# Patient Record
Sex: FEMALE | Race: WHITE | NOT HISPANIC OR LATINO | Employment: OTHER | URBAN - METROPOLITAN AREA
[De-identification: names, ages, dates, MRNs, and addresses within clinical notes are randomized per-mention and may not be internally consistent; named-entity substitution may affect disease eponyms.]

---

## 2017-04-28 ENCOUNTER — HOSPITAL ENCOUNTER (OUTPATIENT)
Facility: OTHER | Age: 57
Discharge: HOME | End: 2017-04-28
Attending: ORTHOPAEDIC SURGERY | Admitting: ORTHOPAEDIC SURGERY
Payer: COMMERCIAL

## 2017-04-28 VITALS
RESPIRATION RATE: 16 BRPM | SYSTOLIC BLOOD PRESSURE: 127 MMHG | HEIGHT: 67 IN | BODY MASS INDEX: 29.03 KG/M2 | HEART RATE: 91 BPM | TEMPERATURE: 98 F | WEIGHT: 185 LBS | DIASTOLIC BLOOD PRESSURE: 65 MMHG | OXYGEN SATURATION: 99 %

## 2017-04-28 DIAGNOSIS — M19.071: ICD-10-CM

## 2017-04-28 PROCEDURE — 63600175 PHARM REV CODE 636 W HCPCS

## 2017-04-28 PROCEDURE — 25500020 PHARM REV CODE 255

## 2017-04-28 PROCEDURE — 25000003 PHARM REV CODE 250

## 2017-04-28 RX ORDER — LOSARTAN POTASSIUM 100 MG/1
100 TABLET ORAL DAILY
COMMUNITY
End: 2020-09-30 | Stop reason: CLARIF

## 2017-04-28 RX ORDER — BUPIVACAINE HYDROCHLORIDE 2.5 MG/ML
INJECTION, SOLUTION EPIDURAL; INFILTRATION; INTRACAUDAL
Status: DISCONTINUED | OUTPATIENT
Start: 2017-04-28 | End: 2017-04-28 | Stop reason: HOSPADM

## 2017-04-28 RX ORDER — TRAZODONE HYDROCHLORIDE 50 MG/1
50 TABLET ORAL NIGHTLY
COMMUNITY

## 2017-04-28 RX ORDER — LAMOTRIGINE 150 MG/1
150 TABLET ORAL 2 TIMES DAILY
COMMUNITY
End: 2020-09-30 | Stop reason: CLARIF

## 2017-04-28 RX ORDER — TRIAMCINOLONE ACETONIDE 40 MG/ML
INJECTION, SUSPENSION INTRA-ARTICULAR; INTRAMUSCULAR
Status: DISCONTINUED | OUTPATIENT
Start: 2017-04-28 | End: 2017-04-28 | Stop reason: HOSPADM

## 2017-04-28 RX ORDER — AMLODIPINE BESYLATE 5 MG/1
5 TABLET ORAL DAILY
COMMUNITY
End: 2020-09-30 | Stop reason: CLARIF

## 2017-04-28 RX ORDER — HYDROCODONE BITARTRATE 40 MG/1
40 CAPSULE, EXTENDED RELEASE ORAL
COMMUNITY
End: 2020-09-30 | Stop reason: CLARIF

## 2017-04-28 RX ORDER — LIDOCAINE HYDROCHLORIDE 10 MG/ML
INJECTION INFILTRATION; PERINEURAL
Status: DISCONTINUED | OUTPATIENT
Start: 2017-04-28 | End: 2017-04-28 | Stop reason: HOSPADM

## 2017-04-28 RX ORDER — PROCHLORPERAZINE MALEATE 10 MG
10 TABLET ORAL 3 TIMES DAILY
COMMUNITY

## 2017-04-28 NOTE — H&P
Consult/H&P Note  Interventional Radiology    Consult Requested By: Shravan    Reason for Consult: subtalar arthritis    SUBJECTIVE:     Chief Complaint: R foot pain    History of Present Illness: 57 yo F with longstanding R foot pain, worse with inversion/eversion.    Past Medical History:   Diagnosis Date    Migraines 2015     Past Surgical History:   Procedure Laterality Date    TOTAL KNEE ARTHROPLASTY Bilateral 2007    right Jan. 2007; left Jan 2010     History reviewed. No pertinent family history.  Social History   Substance Use Topics    Smoking status: Never Smoker    Smokeless tobacco: Never Used    Alcohol use No       Review of Systems:  Constitutional/General:No fever, chills, change in appetite or weight loss.  Hematological/Immuno: no known coagulopathies  Respiratory: no shortness of breath  Cardiovascular: no chest pain  Gastrointestinal: no abdominal pain  Genito-Urinary: no dysuria  Musculoskeletal: positive for - pain in foot - right  Skin: Negative for rash, itching, pigmentation changes, nail or hair changes.  Neurological: no TIA or stroke symptoms  Psychiatric: normal mood/affect, good insight/judgement      OBJECTIVE:     Vital Signs Range (Last 24H):  Temp:  [98.2 °F (36.8 °C)]   Pulse:  [84]   Resp:  [16]   BP: (126)/(67)   SpO2:  [97 %]     Physical Exam:  General- Patient alert and oriented x3 in NAD  ENT- PERRLA,  Neck- No masses  CV- Regular rate and rhythm  Resp-  No increased WOB  GI- Non tender/non-distended  Extrem- No cyanosis, clubbing, edema. R foot pain with inversion/eversion; plantar and dorsiflexion  Is better tolerated  Derm- No rashes, masses, or lesions noted  Neuro-  No focal deficits noted.     Physical Exam  Body mass index is 28.98 kg/(m^2).    Scheduled Meds:   Continuous Infusions:   PRN Meds:    Allergies:   Review of patient's allergies indicates:   Allergen Reactions    Chlorhexidine gluconate Hives    Betadine surgi-prep Rash       Labs:  No results for  input(s): INR in the last 168 hours.    Invalid input(s):  PT,  PTT  No results for input(s): WBC, HGB, HCT, MCV, PLT in the last 168 hours. No results for input(s): GLU, NA, K, CL, CO2, BUN, CREATININE, CALCIUM, MG, ALT, AST, ALBUMIN, BILITOT, BILIDIR in the last 168 hours.    Vitals (Most Recent):  Temp: 98.2 °F (36.8 °C) (04/28/17 1006)  Pulse: 84 (04/28/17 1006)  Resp: 16 (04/28/17 1006)  BP: 126/67 (04/28/17 1006)  SpO2: 97 % (04/28/17 1006)    ASA: 2  Mallampati: 2    Consent obtained    ASSESSMENT/PLAN:     R subtalar joint injection    Active Hospital Problems    Diagnosis  POA    Degenerative arthritis of right ankle [M19.071]  Yes      Resolved Hospital Problems    Diagnosis Date Resolved POA   No resolved problems to display.           Magen Maguire MD

## 2017-04-28 NOTE — DISCHARGE INSTRUCTIONS

## 2017-04-28 NOTE — IP AVS SNAPSHOT
[facility] [place], operated by [facility]  [address] [place] [id number]  Phone: [phone]           Patient Discharge Instructions   Our goal is to set you up for success. This packet includes information on your condition, medications, and your home care.  It will help you care for yourself to prevent having to return to the hospital.     Please ask your nurse if you have any questions.      There are many details to remember when preparing to leave the hospital. Here is what you will need to do:    1. Take your medicine. If you are prescribed medications, review your Medication List on the following pages. You may have new medications to  at the pharmacy and others that you'll need to stop taking. Review the instructions for how and when to take your medications. Talk with your doctor or nurses if you are unsure of what to do.     2. Go to your follow-up appointments. Specific follow-up information is listed in the following pages. Your may be contacted by a nurse or clinical provider about future appointments. Be sure we have all of the phone numbers to reach you. Please contact your provider's office if you are unable to make an appointment.     3. Watch for warning signs. Your doctor or nurse will give you detailed warning signs to watch for and when to call for assistance. These instructions may also include educational information about your condition. If you experience any of warning signs to your health, call your doctor.               ** Verify the list of medication(s) below is accurate and up to date. Carry this with you in case of emergency. If your medications have changed, please notify your healthcare provider.             Medication List      CONTINUE taking these medications        Additional Info                      amlodipine 5 MG tablet   Commonly known as:  NORVASC   Refills:  0   Dose:  5 mg    Instructions:  Take 5 mg by mouth once daily.     Begin Date    AM    Noon    PM     Bedtime       hydrocodone bitartrate 40 mg Cr12   Refills:  0   Dose:  40 mg    Instructions:  Take 40 mg by mouth every 12 (twelve) hours.     Begin Date    AM    Noon    PM    Bedtime       lamotrigine 150 MG Tab   Commonly known as:  LAMICTAL   Refills:  0   Dose:  150 mg    Instructions:  Take 150 mg by mouth 2 (two) times daily.     Begin Date    AM    Noon    PM    Bedtime       losartan 100 MG tablet   Commonly known as:  COZAAR   Refills:  0   Dose:  100 mg    Instructions:  Take 100 mg by mouth once daily.     Begin Date    AM    Noon    PM    Bedtime       prochlorperazine 10 MG tablet   Commonly known as:  COMPAZINE   Refills:  0   Dose:  10 mg    Instructions:  Take 10 mg by mouth 3 (three) times daily.     Begin Date    AM    Noon    PM    Bedtime       trazodone 50 MG tablet   Commonly known as:  DESYREL   Refills:  0   Dose:  50 mg    Instructions:  Take 50 mg by mouth every evening.     Begin Date    AM    Noon    PM    Bedtime                  Please bring to all follow up appointments:    1. A copy of your discharge instructions.  2. All medicines you are currently taking in their original bottles.  3. Identification and insurance card.    Please arrive 15 minutes ahead of scheduled appointment time.    Please call 24 hours in advance if you must reschedule your appointment and/or time.        Follow-up Information     Call Field Shravan MD.    Specialty:  Orthopedic Surgery    Why:  As needed    Contact information:    [address]  [facility]  [facility] [id number] [phone]          Discharge Instructions     Future Orders    Activity as tolerated     Call MD for:  redness, tenderness, or signs of infection (pain, swelling, redness, odor or green/yellow discharge around incision site)     Diet general     Questions:    Total calories:      Fat restriction, if any:      Protein restriction, if any:      Na restriction, if any:      Fluid restriction:      Additional  "restrictions:      Remove dressing in 24 hours         Discharge Instructions       Home Care Instructions Pain Management:    1. DIET:   You may resume your normal diet today.   2. BATHING:   You may shower with luke warm water.  3. DRESSING:   You may remove your bandage today.   4. ACTIVITY LEVEL:   You may resume your normal activities 24 hrs after your procedure.  5. MEDICATIONS:   You may resume your normal medications today.   6. SPECIAL INSTRUCTIONS:   No heat to the injection site for 24 hrs including, bath or shower, heating pad, moist heat, or hot tubs.    Use ice pack to injection site for any pain or discomfort.  Apply ice packs for 20 minute intervals as needed.   If you have received any sedatives by mouth today you may not drive for 12 hours.    If you have received any sedation through your IV, you may not drive for 24 hrs.     PLEASE CALL YOUR DOCTOR IF:  1. Redness or swelling around the injection site.  2. Fever of 101 degrees  3. Drainage (pus) from the injection site.  4. For any continuous bleeding (some dried blood over the incision is normal.)    FOR EMERGENCIES:   If any unusual problems or difficulties occur during clinic hours, call [phone] or [phone].         Admission Information     Date & Time Provider Department CSN    4/28/2017  9:18 AM Andre De La Garza) MD Shravan [facility] [id number]      Care Providers     Provider Role Specialty Primary office phone    Andre De La Garza) MD Shravan Attending Provider Orthopedic Surgery [phone]    [facility] Diagnostic Provider Surgeon  -- Number not on file      Your Vitals Were     BP Pulse Temp Resp Height Weight    127/65 (BP Location: Right arm, Patient Position: Lying, BP Method: Automatic) 91 98.2 °F (36.8 °C) (Oral) 16 5' 7" (1.702 m) 83.9 kg (185 lb)    SpO2 BMI             99% 28.98 kg/m2         Recent Lab Values     No lab values to display.      Allergies as of 4/28/2017        Reactions    Chlorhexidine Gluconate " Hives    Betadine Surgi-prep Rash      [facility]     [facility] Nurse Care Line - 24/7 Assistance  Unless otherwise directed by your provider, please contact [facility], our nurse care line that is available for 24/7 assistance.     Registered nurses in the [facility] provide clinical advisement, health education, appointment booking, and other advisory services.  Call for this free service at [phone].        Advance Directives     An advance directive is a document which, in the event you are no longer able to make decisions for yourself, tells your healthcare team what kind of treatment you do or do not want to receive, or who you would like to make those decisions for you.  If you do not currently have an advance directive, [facility] encourages you to create one.  For more information call:  [phone] ([phone]), [phone] ([phone]),  or log on to [email or web address].        Language Assistance Services     ATTENTION: Language assistance services are available, free of charge. Please call [phone].      ATENCIÓN: Si habla español, tiene a jones disposición servicios gratuitos de asistencia lingüística. Llame al [phone].     CHÚ Ý: N?u b?n nói Ti?ng Vi?t, có các d?ch v? h? tr? ngôn ng? mi?n phí dành cho b?n. G?i s? [phone].        [facility] Sign-Up     Activating your [facility] account is as easy as 1-2-3!     1) Visit [email or web address], select Sign Up Now, enter this activation code and your date of birth, then select Next.  [email or web address]  Expires: 6/12/2017 11:15 AM      2) Create a username and password to use when you visit [facility] in the future and select a security question in case you lose your password and select Next.    3) Enter your e-mail address and click Sign Up!    Additional Information  If you have questions, please e-mail [email or web address] or call [phone] to talk to our [facility] staff. Remember, [facility] is NOT to  be used for urgent needs. For medical emergencies, dial 911.          [facility] complies with applicable Federal civil rights laws and does not discriminate on the basis of race, color, national origin, age, disability, or sex.

## 2017-04-28 NOTE — DISCHARGE SUMMARY
Radiology Post-Procedure Note    Pre Op Diagnosis: R subtalar arthritis  Post Op Diagnosis: Same    Procedure: R subtalar joint injection    Procedure performed by: Andrez    Written Informed Consent Obtained: Yes  Specimen Removed: NO  Estimated Blood Loss: Minimal    Findings:   Successful R subtalar joint injection, 40 mg Kenalog, 2 cc 0.25% bupivicaine    Patient tolerated procedure well.    @SIG@

## 2017-04-28 NOTE — PLAN OF CARE

## 2017-05-02 NOTE — PROCEDURES
Pre Op Diagnosis: R subtalar arthritis  Post Op Diagnosis: Same     Procedure: R subtalar joint injection     Procedure performed by: Andrez     Written Informed Consent Obtained: Yes  Specimen Removed: NO  Estimated Blood Loss: Minimal     Findings:   Successful R subtalar joint injection, 40 mg Kenalog, 2 cc 0.25% bupivicaine

## 2017-11-10 ENCOUNTER — SURGERY (OUTPATIENT)
Age: 57
End: 2017-11-10

## 2017-11-10 ENCOUNTER — APPOINTMENT (OUTPATIENT)
Dept: INTERVENTIONAL RADIOLOGY/VASCULAR | Facility: OTHER | Age: 57
End: 2017-11-10
Attending: ORTHOPAEDIC SURGERY
Payer: COMMERCIAL

## 2017-11-10 ENCOUNTER — HOSPITAL ENCOUNTER (OUTPATIENT)
Facility: OTHER | Age: 57
Discharge: HOME | End: 2017-11-10
Attending: RADIOLOGY | Admitting: RADIOLOGY
Payer: COMMERCIAL

## 2017-11-10 VITALS
BODY MASS INDEX: 29.82 KG/M2 | RESPIRATION RATE: 16 BRPM | HEIGHT: 67 IN | OXYGEN SATURATION: 99 % | SYSTOLIC BLOOD PRESSURE: 122 MMHG | TEMPERATURE: 99 F | WEIGHT: 190 LBS | DIASTOLIC BLOOD PRESSURE: 64 MMHG | HEART RATE: 87 BPM

## 2017-11-10 DIAGNOSIS — M19.071 PRIMARY OSTEOARTHRITIS OF RIGHT ANKLE: ICD-10-CM

## 2017-11-10 DIAGNOSIS — M19.071 PRIMARY OSTEOARTHRITIS, RIGHT ANKLE AND FOOT: Primary | ICD-10-CM

## 2017-11-10 DIAGNOSIS — M79.673 FOOT PAIN: ICD-10-CM

## 2017-11-10 PROCEDURE — 25500020 PHARM REV CODE 255

## 2017-11-10 PROCEDURE — 63600175 PHARM REV CODE 636 W HCPCS

## 2017-11-10 PROCEDURE — 77002 NEEDLE LOCALIZATION BY XRAY: CPT | Mod: 26,,, | Performed by: RADIOLOGY

## 2017-11-10 PROCEDURE — 20605 DRAIN/INJ JOINT/BURSA W/O US: CPT | Mod: RT,,, | Performed by: RADIOLOGY

## 2017-11-10 PROCEDURE — 25000003 PHARM REV CODE 250

## 2017-11-10 PROCEDURE — 20600 DRAIN/INJ JOINT/BURSA W/O US: CPT

## 2017-11-10 RX ORDER — BUPIVACAINE HYDROCHLORIDE 2.5 MG/ML
INJECTION, SOLUTION EPIDURAL; INFILTRATION; INTRACAUDAL
Status: DISCONTINUED | OUTPATIENT
Start: 2017-11-10 | End: 2017-11-10 | Stop reason: HOSPADM

## 2017-11-10 RX ORDER — TRIAMCINOLONE ACETONIDE 40 MG/ML
INJECTION, SUSPENSION INTRA-ARTICULAR; INTRAMUSCULAR
Status: DISCONTINUED | OUTPATIENT
Start: 2017-11-10 | End: 2017-11-10 | Stop reason: HOSPADM

## 2017-11-10 RX ORDER — LIDOCAINE HYDROCHLORIDE 10 MG/ML
INJECTION, SOLUTION EPIDURAL; INFILTRATION; INTRACAUDAL; PERINEURAL
Status: DISCONTINUED | OUTPATIENT
Start: 2017-11-10 | End: 2017-11-10 | Stop reason: HOSPADM

## 2017-11-10 RX ADMIN — LIDOCAINE HYDROCHLORIDE 3 ML: 10 INJECTION, SOLUTION EPIDURAL; INFILTRATION; INTRACAUDAL; PERINEURAL at 10:11

## 2017-11-10 RX ADMIN — TRIAMCINOLONE ACETONIDE 40 MG: 40 INJECTION, SUSPENSION INTRA-ARTICULAR; INTRAMUSCULAR at 10:11

## 2017-11-10 RX ADMIN — BUPIVACAINE HYDROCHLORIDE 3 ML: 2.5 INJECTION, SOLUTION EPIDURAL; INFILTRATION; INTRACAUDAL at 10:11

## 2017-11-10 NOTE — PROCEDURES
Radiology Post-Procedure Note    Pre Op Diagnosis: R subtalar arthritis  Post Op Diagnosis: Same    Procedure: subtalar steroid injection    Procedure performed by: Magen Maguire MD    Written Informed Consent Obtained: Yes  Specimen Removed: NO  Estimated Blood Loss: Minimal    Findings:   Successful R subtalar injection.  40 mg Kenalog, 2 cc bupivicaine.    Patient tolerated procedure well.    @SIG@

## 2017-11-10 NOTE — H&P
Consult/H&P Note  Interventional Radiology    Consult Requested By: Shravan     Reason for Consult: R foot pain    SUBJECTIVE:     Chief Complaint: R foot pain    History of Present Illness: 58 yo F with R foot pain, had a R subtalar joint injection 4/28/17 with good relief, although pain is returning.    Past Medical History:   Diagnosis Date    Migraines 2015     Past Surgical History:   Procedure Laterality Date    TOTAL KNEE ARTHROPLASTY Bilateral 2007    right Jan. 2007; left Jan 2010     History reviewed. No pertinent family history.  Social History   Substance Use Topics    Smoking status: Never Smoker    Smokeless tobacco: Never Used    Alcohol use No       Review of Systems:  Constitutional/General:No fever, chills, change in appetite or weight loss.  Hematological/Immuno: no known coagulopathies  Respiratory: no shortness of breath  Cardiovascular: no chest pain  Gastrointestinal: no abdominal pain  Genito-Urinary: no dysuria  Musculoskeletal: positive for - pain in foot - right  Skin: Negative for rash, itching, pigmentation changes, nail or hair changes.  Neurological: no TIA or stroke symptoms  Psychiatric: normal mood/affect, good insight/judgement      OBJECTIVE:     Vital Signs Range (Last 24H):  Temp:  [98.7 °F (37.1 °C)]   Pulse:  [87]   Resp:  [18]   BP: (129)/(73)   SpO2:  [97 %]     Physical Exam:  General- Patient alert and oriented x3 in NAD  ENT- PERRLA,  Neck- No masses  CV- Regular rate and rhythm  Resp-  No increased WOB  GI- Non tender/non-distended  Extrem- No cyanosis, clubbing, edema.   Derm- No rashes, masses, or lesions noted  Neuro-  No focal deficits noted.     Physical Exam  Body mass index is 29.76 kg/m².    Scheduled Meds:   Continuous Infusions:   PRN Meds:    Allergies:   Review of patient's allergies indicates:   Allergen Reactions    Chlorhexidine gluconate Hives    Betadine surgi-prep Rash       Labs:  No results for input(s): INR in the last 168 hours.    Invalid  input(s):  PT,  PTT  No results for input(s): WBC, HGB, HCT, MCV, PLT in the last 168 hours. No results for input(s): GLU, NA, K, CL, CO2, BUN, CREATININE, CALCIUM, MG, ALT, AST, ALBUMIN, BILITOT, BILIDIR in the last 168 hours.    Vitals (Most Recent):  Temp: 98.7 °F (37.1 °C) (11/10/17 0936)  Pulse: 87 (11/10/17 0936)  Resp: 18 (11/10/17 0936)  BP: 129/73 (11/10/17 0936)  SpO2: 97 % (11/10/17 0936)    ASA: 2  Mallampati: 2    Consent obtained    ASSESSMENT/PLAN:     R foot subtalar joint injection.    Active Hospital Problems    Diagnosis  POA    Foot pain [M79.673]  Yes      Resolved Hospital Problems    Diagnosis Date Resolved POA   No resolved problems to display.           Magen Maguire MD

## 2017-11-10 NOTE — DISCHARGE INSTRUCTIONS

## 2017-11-10 NOTE — DISCHARGE SUMMARY
[facility]  Discharge Summary      Admit Date: 11/10/2017    Discharge Date and Time:  11/10/2017 10:37 AM    Attending Physician: Magen Maguire MD     Reason for Admission: subtalar injection    Procedures Performed: Procedure(s) (LRB):  INJECTION (Right)    Hospital Course (synopsis of major diagnoses, care, treatment, and services provided during the course of the hospital stay): uneventful     Final Diagnoses:    Principal Problem: <principal problem not specified>   Secondary Diagnoses:   Active Hospital Problems    Diagnosis  POA    Foot pain [M79.673]  Yes      Resolved Hospital Problems    Diagnosis Date Resolved POA   No resolved problems to display.       Discharged Condition: good    Disposition: Home or Self Care    Follow Up/Patient Instructions:     Medications:  Reconciled Home Medications:   Current Discharge Medication List      CONTINUE these medications which have NOT CHANGED    Details   amlodipine (NORVASC) 5 MG tablet Take 5 mg by mouth once daily.      hydrocodone bitartrate 40 mg CR12 Take 40 mg by mouth every 12 (twelve) hours.      lamotrigine (LAMICTAL) 150 MG Tab Take 150 mg by mouth 2 (two) times daily.      losartan (COZAAR) 100 MG tablet Take 100 mg by mouth once daily.      prochlorperazine (COMPAZINE) 10 MG tablet Take 10 mg by mouth 3 (three) times daily.      trazodone (DESYREL) 50 MG tablet Take 50 mg by mouth every evening.           No discharge procedures on file.  Follow-up Information     Field Shravan MD.    Specialty:  Orthopedic Surgery  Why:  As needed  Contact information:  [address]  [facility]  [facility] [address] [phone]

## 2018-10-29 ENCOUNTER — LAB VISIT (OUTPATIENT)
Dept: LAB | Facility: HOSPITAL | Age: 58
End: 2018-10-29
Attending: PSYCHIATRY & NEUROLOGY
Payer: COMMERCIAL

## 2018-10-29 DIAGNOSIS — R49.0 DYSPHONIA OF ORGANIC TREMOR: ICD-10-CM

## 2018-10-29 DIAGNOSIS — G43.919 INTRACTABLE MIGRAINE: ICD-10-CM

## 2018-10-29 DIAGNOSIS — R25.1 DYSPHONIA OF ORGANIC TREMOR: ICD-10-CM

## 2018-10-29 DIAGNOSIS — G43.109 CLASSICAL MIGRAINE: ICD-10-CM

## 2018-10-29 DIAGNOSIS — G43.711 CHRONIC MIGRAINE WITHOUT AURA, WITH INTRACTABLE MIGRAINE, SO STATED, WITH STATUS MIGRAINOSUS: Primary | ICD-10-CM

## 2018-10-29 DIAGNOSIS — R41.3 MEMORY LOSS: ICD-10-CM

## 2018-10-29 DIAGNOSIS — G62.9 PERIPHERAL NERVE DISORDER: ICD-10-CM

## 2018-10-29 DIAGNOSIS — G44.85 STABBING HEADACHE: ICD-10-CM

## 2018-10-29 PROCEDURE — 86255 FLUORESCENT ANTIBODY SCREEN: CPT | Mod: 59

## 2018-10-29 PROCEDURE — 36415 COLL VENOUS BLD VENIPUNCTURE: CPT

## 2018-11-23 LAB
ACHR BIND AB SER-SCNC: 0 NMOL/L
AMPA-R AB CBA, SERUM: NEGATIVE
AMPHIPHYSIN AB TITR SER: NEGATIVE TITER
CASPR2-IGG CBA: NEGATIVE
CV2 IGG TITR SER: NEGATIVE TITER
GABA-B-R AB CBA, SERUM: NEGATIVE
GAD65 AB SER-SCNC: 0 NMOL/L
GLIAL NUC TYPE 1 AB TITR SER: NEGATIVE TITER
HU1 AB TITR SER: NEGATIVE TITER
HU2 AB TITR SER IF: NEGATIVE TITER
HU3 AB TITR SER: NEGATIVE TITER
IMMUNOLOGIST REVIEW: NORMAL
LGI1-IGG CBA: NEGATIVE
NACHR AB SER-SCNC: 0 NMOL/L
NMDA-R AB CBA, SERUM: NEGATIVE
PAVAL REFLEX TEST ADDED: NORMAL
PCA-1 AB TITR SER: NEGATIVE TITER
PCA-2 AB TITR SER: NEGATIVE TITER
PCA-TR AB TITR SER: NEGATIVE TITER
VGCC-N BIND AB SER-SCNC: 0 NMOL/L
VGCC-P/Q BIND AB SER-SCNC: 0 NMOL/L
VGKC AB SER-SCNC: 0 NMOL/L

## 2020-09-30 ENCOUNTER — HOSPITAL ENCOUNTER (OUTPATIENT)
Dept: PREADMISSION TESTING | Facility: OTHER | Age: 60
Discharge: HOME | End: 2020-09-30
Attending: ORTHOPAEDIC SURGERY
Payer: MEDICARE

## 2020-09-30 ENCOUNTER — ANESTHESIA EVENT (OUTPATIENT)
Dept: SURGERY | Facility: OTHER | Age: 60
End: 2020-09-30
Payer: MEDICARE

## 2020-09-30 VITALS
OXYGEN SATURATION: 98 % | HEIGHT: 67 IN | WEIGHT: 202 LBS | DIASTOLIC BLOOD PRESSURE: 57 MMHG | HEART RATE: 69 BPM | BODY MASS INDEX: 31.71 KG/M2 | SYSTOLIC BLOOD PRESSURE: 117 MMHG | TEMPERATURE: 98 F

## 2020-09-30 RX ORDER — ESLICARBAZEPINE ACETATE 200 MG/1
400 TABLET ORAL
COMMUNITY
End: 2022-03-10 | Stop reason: CLARIF

## 2020-09-30 RX ORDER — SODIUM CHLORIDE, SODIUM LACTATE, POTASSIUM CHLORIDE, CALCIUM CHLORIDE 600; 310; 30; 20 MG/100ML; MG/100ML; MG/100ML; MG/100ML
INJECTION, SOLUTION INTRAVENOUS CONTINUOUS
Status: CANCELLED | OUTPATIENT
Start: 2020-09-30

## 2020-09-30 RX ORDER — ZONISAMIDE 100 MG/1
200 CAPSULE ORAL DAILY
COMMUNITY

## 2020-09-30 RX ORDER — PROPRANOLOL HYDROCHLORIDE 60 MG/1
60 TABLET ORAL 3 TIMES DAILY
COMMUNITY
End: 2022-03-10 | Stop reason: CLARIF

## 2020-09-30 RX ORDER — LIDOCAINE HYDROCHLORIDE 10 MG/ML
0.5 INJECTION, SOLUTION EPIDURAL; INFILTRATION; INTRACAUDAL; PERINEURAL ONCE
Status: CANCELLED | OUTPATIENT
Start: 2020-09-30 | End: 2020-09-30

## 2020-09-30 RX ORDER — OXYCODONE HCL 10 MG/1
10 TABLET, FILM COATED, EXTENDED RELEASE ORAL EVERY 12 HOURS PRN
COMMUNITY
End: 2020-10-11

## 2020-09-30 RX ORDER — ACETAMINOPHEN 500 MG
1000 TABLET ORAL
Status: CANCELLED | OUTPATIENT
Start: 2020-09-30 | End: 2020-09-30

## 2020-09-30 RX ORDER — PANTOPRAZOLE SODIUM 40 MG/1
40 TABLET, DELAYED RELEASE ORAL DAILY
COMMUNITY

## 2020-09-30 RX ORDER — SIMVASTATIN 20 MG/1
20 TABLET, FILM COATED ORAL NIGHTLY
COMMUNITY

## 2020-09-30 NOTE — DISCHARGE INSTRUCTIONS
Information to Prepare you for your Surgery    PRE-ADMIT TESTING -  [phone]    [address]  [facility]          Your surgery has been scheduled at [facility]. We are pleased to have the opportunity to serve you. For Further Information please call [phone].    On the day of surgery please report to the Information Desk on the 1st floor.    · CONTACT YOUR PHYSICIAN'S OFFICE THE DAY PRIOR TO YOUR SURGERY TO OBTAIN YOUR ARRIVAL TIME.     · The evening before surgery do not eat anything after 9 p.m. ( this includes hard candy, chewing gum and mints).  You may only have GATORADE, POWERADE AND WATER  from 9 p.m. until you leave your home.   DO NOT DRINK ANY LIQUIDS ON THE WAY TO THE HOSPITAL.      SPECIAL MEDICATION INSTRUCTIONS: TAKE medications checked off by the Anesthesiologist on your Medication List.    Angiogram Patients: Take medications as instructed by your physician, including aspirin.     Surgery Patients:    If you take ASPIRIN - Your PHYSICIAN/SURGEON will need to inform you IF/OR when you need to stop taking aspirin prior to your surgery.     Do Not take any medications containing IBUPROFEN.  Do Not Wear any make-up or dark nail polish   (especially eye make-up) to surgery. If you come to surgery with makeup on you will be required to remove the makeup or nail polish.    Do not shave your surgical area at least 5 days prior to your surgery. The surgical prep will be performed at the hospital according to Infection Control regulations.    Leave all valuables at home.   Do Not wear any jewelry or watches, including any metal in body piercings. Jewelry must be removed prior to coming to the hospital.  There is a possibility that rings that are unable to be removed may be cut off if they are on the surgical extremity.    Contact Lens must be removed before surgery. Either do not wear the contact lens or bring a case and solution for  storage.  Please bring a container for eyeglasses or dentures as required.  Bring any paperwork your physician has provided, such as consent forms,  history and physicals, doctor's orders, etc.   Bring comfortable clothes that are loose fitting to wear upon discharge. Take into consideration the type of surgery being performed.  Maintain your diet as advised per your physician the day prior to surgery.      Adequate rest the night before surgery is advised.   Park in the Parking lot behind the hospital or in the [place] [facility] across the street from the parking lot. Parking is complimentary.  If you will be discharged the same day as your procedure, please arrange for a responsible adult to drive you home or to accompany you if traveling by taxi.   YOU WILL NOT BE PERMITTED TO DRIVE OR TO LEAVE THE HOSPITAL ALONE AFTER SURGERY.   If you are being discharged the same day, it is strongly recommended that you arrange for someone to remain with you for the first 24 hrs following your surgery.    The Surgeon will speak to your family/visitor after your surgery regarding the outcome of your surgery and post op care.  The Surgeon may speak to you after your surgery, but there is a possibility you may not remember the details.  Please check with your family members regarding the conversation with the Surgeon.    We strongly recommend whoever is bringing you home be present for discharge instructions.  This will ensure a thorough understanding for your post op home care.    ALL CHILDREN MUST ALWAYS BE ACCOMPANIED BY AN ADULT.    Visitors-Refer to current Visitor policy handouts.    Thank you for your cooperation.  The Staff of [facility].                Bathing Instructions with Hibiclens     Shower the evening before and morning of your procedure with Hibiclens:   Wash your face with water and your regular face wash/soap   Apply Hibiclens directly on your skin or on a wet washcloth and wash  gently. When showering: Move away from the shower stream when applying Hibiclens to avoid rinsing off too soon.   Rinse thoroughly with warm water   Do not dilute Hibiclens         Dry off as usual, do not use any deodorant, powder, body lotions, perfume, after shave or cologne.

## 2020-09-30 NOTE — ANESTHESIA PREPROCEDURE EVALUATION
09/30/2020  Kecia Downing is a 60 y.o., female.    Anesthesia Evaluation    I have reviewed the Patient Summary Reports.    I have reviewed the Nursing Notes. I have reviewed the NPO Status.   I have reviewed the Medications.     Review of Systems  Anesthesia Hx:  Denies Family Hx of Anesthesia complications.  Personal Hx of Anesthesia complications, Post-Operative Nausea/Vomiting, in the past, but not with recent anesthetics / prophylaxis   Social:  Non-Smoker    Hematology/Oncology:  Hematology Normal   Oncology Normal     EENT/Dental:EENT/Dental Normal   Cardiovascular:   Denies Hypertension.     Pulmonary:  Pulmonary Normal    Renal/:  Renal/ Normal     Hepatic/GI:   GERD, well controlled    Musculoskeletal:   Arthritis   Spine Disorders: lumbar and cervical Chronic Pain    Neurological:   Headaches (rare migraine on prophylaxis, Ajovy injections, inderal)    Endocrine:  Endocrine Normal    Dermatological:  Skin Normal    Psych:  Psychiatric Normal           Physical Exam  General:  Obesity      Dental:  Dental Findings: In tact         Mental Status:  Mental Status Findings:  Cooperative, Alert and Oriented         Anesthesia Plan  Type of Anesthesia, risks & benefits discussed:  Anesthesia Type:  general, regional  Patient's Preference:   Intra-op Monitoring Plan: standard ASA monitors  Intra-op Monitoring Plan Comments:   Post Op Pain Control Plan: per primary service following discharge from PACU  Post Op Pain Control Plan Comments:   Induction:   IV  Beta Blocker:         Informed Consent: Patient understands risks and agrees with Anesthesia plan.  Questions answered. Anesthesia consent signed with patient.  ASA Score: 2     Day of Surgery Review of History & Physical:    H&P update referred to the surgeon.     Anesthesia Plan Notes: Discussed regional vs GA with local by surgeon.  Pt  had a bad experience in a bunionectomy with MAC, a lot of back pain and anxiety (although did not feel the surgery) Strongly prefers GA.         Ready For Surgery From Anesthesia Perspective.

## 2020-10-06 ENCOUNTER — CLINICAL SUPPORT (OUTPATIENT)
Dept: URGENT CARE | Facility: CLINIC | Age: 60
End: 2020-10-06
Payer: MEDICARE

## 2020-10-06 VITALS — TEMPERATURE: 98 F

## 2020-10-06 DIAGNOSIS — Z01.818 PREOP TESTING: ICD-10-CM

## 2020-10-06 LAB — SARS-COV-2 RNA RESP QL NAA+PROBE: NOT DETECTED

## 2020-10-06 PROCEDURE — 99000 PR SPECIMEN HANDLING,DR OFF->LAB: ICD-10-PCS | Mod: S$GLB,,, | Performed by: STUDENT IN AN ORGANIZED HEALTH CARE EDUCATION/TRAINING PROGRAM

## 2020-10-06 PROCEDURE — 99000 SPECIMEN HANDLING OFFICE-LAB: CPT | Mod: S$GLB,,, | Performed by: STUDENT IN AN ORGANIZED HEALTH CARE EDUCATION/TRAINING PROGRAM

## 2020-10-06 PROCEDURE — U0003 INFECTIOUS AGENT DETECTION BY NUCLEIC ACID (DNA OR RNA); SEVERE ACUTE RESPIRATORY SYNDROME CORONAVIRUS 2 (SARS-COV-2) (CORONAVIRUS DISEASE [COVID-19]), AMPLIFIED PROBE TECHNIQUE, MAKING USE OF HIGH THROUGHPUT TECHNOLOGIES AS DESCRIBED BY CMS-2020-01-R: HCPCS

## 2020-10-09 ENCOUNTER — HOSPITAL ENCOUNTER (OUTPATIENT)
Facility: OTHER | Age: 60
Discharge: HOME | End: 2020-10-09
Attending: ORTHOPAEDIC SURGERY | Admitting: ORTHOPAEDIC SURGERY
Payer: MEDICARE

## 2020-10-09 ENCOUNTER — ANESTHESIA (OUTPATIENT)
Dept: SURGERY | Facility: OTHER | Age: 60
End: 2020-10-09
Payer: MEDICARE

## 2020-10-09 VITALS
HEIGHT: 67 IN | SYSTOLIC BLOOD PRESSURE: 109 MMHG | WEIGHT: 202 LBS | RESPIRATION RATE: 16 BRPM | BODY MASS INDEX: 31.71 KG/M2 | TEMPERATURE: 98 F | OXYGEN SATURATION: 93 % | HEART RATE: 97 BPM | DIASTOLIC BLOOD PRESSURE: 57 MMHG

## 2020-10-09 DIAGNOSIS — M19.071 PRIMARY OSTEOARTHRITIS OF RIGHT ANKLE: ICD-10-CM

## 2020-10-09 DIAGNOSIS — G56.01 CARPAL TUNNEL SYNDROME ON RIGHT: Primary | ICD-10-CM

## 2020-10-09 DIAGNOSIS — Z01.818 PREOP TESTING: ICD-10-CM

## 2020-10-09 PROCEDURE — 25000003 PHARM REV CODE 250: Performed by: SPECIALIST

## 2020-10-09 PROCEDURE — 63600175 PHARM REV CODE 636 W HCPCS: Performed by: ANESTHESIOLOGY

## 2020-10-09 PROCEDURE — 25000003 PHARM REV CODE 250: Performed by: ANESTHESIOLOGY

## 2020-10-09 PROCEDURE — 36000709 HC OR TIME LEV III EA ADD 15 MIN: Performed by: ORTHOPAEDIC SURGERY

## 2020-10-09 PROCEDURE — 63600175 PHARM REV CODE 636 W HCPCS: Performed by: NURSE ANESTHETIST, CERTIFIED REGISTERED

## 2020-10-09 PROCEDURE — 25000003 PHARM REV CODE 250: Performed by: NURSE ANESTHETIST, CERTIFIED REGISTERED

## 2020-10-09 PROCEDURE — 71000016 HC POSTOP RECOV ADDL HR: Performed by: ORTHOPAEDIC SURGERY

## 2020-10-09 PROCEDURE — 71000039 HC RECOVERY, EACH ADD'L HOUR: Performed by: ORTHOPAEDIC SURGERY

## 2020-10-09 PROCEDURE — 37000008 HC ANESTHESIA 1ST 15 MINUTES: Performed by: ORTHOPAEDIC SURGERY

## 2020-10-09 PROCEDURE — 71000015 HC POSTOP RECOV 1ST HR: Performed by: ORTHOPAEDIC SURGERY

## 2020-10-09 PROCEDURE — 27201423 OPTIME MED/SURG SUP & DEVICES STERILE SUPPLY: Performed by: ORTHOPAEDIC SURGERY

## 2020-10-09 PROCEDURE — 71000033 HC RECOVERY, INTIAL HOUR: Performed by: ORTHOPAEDIC SURGERY

## 2020-10-09 PROCEDURE — 36000708 HC OR TIME LEV III 1ST 15 MIN: Performed by: ORTHOPAEDIC SURGERY

## 2020-10-09 PROCEDURE — 37000009 HC ANESTHESIA EA ADD 15 MINS: Performed by: ORTHOPAEDIC SURGERY

## 2020-10-09 PROCEDURE — C1713 ANCHOR/SCREW BN/BN,TIS/BN: HCPCS | Performed by: ORTHOPAEDIC SURGERY

## 2020-10-09 PROCEDURE — S0028 INJECTION, FAMOTIDINE, 20 MG: HCPCS | Performed by: NURSE ANESTHETIST, CERTIFIED REGISTERED

## 2020-10-09 PROCEDURE — 25000003 PHARM REV CODE 250: Performed by: ORTHOPAEDIC SURGERY

## 2020-10-09 DEVICE — SYS CMC LIG RECON IMPLANT: Type: IMPLANTABLE DEVICE | Site: HAND | Status: FUNCTIONAL

## 2020-10-09 RX ORDER — FENTANYL CITRATE 50 UG/ML
INJECTION, SOLUTION INTRAMUSCULAR; INTRAVENOUS
Status: DISCONTINUED | OUTPATIENT
Start: 2020-10-09 | End: 2020-10-09

## 2020-10-09 RX ORDER — ONDANSETRON 2 MG/ML
4 INJECTION INTRAMUSCULAR; INTRAVENOUS DAILY PRN
Status: DISCONTINUED | OUTPATIENT
Start: 2020-10-09 | End: 2020-10-09 | Stop reason: HOSPADM

## 2020-10-09 RX ORDER — OXYCODONE HYDROCHLORIDE 5 MG/1
5 TABLET ORAL
Status: DISCONTINUED | OUTPATIENT
Start: 2020-10-09 | End: 2020-10-09 | Stop reason: HOSPADM

## 2020-10-09 RX ORDER — DEXAMETHASONE SODIUM PHOSPHATE 4 MG/ML
INJECTION, SOLUTION INTRA-ARTICULAR; INTRALESIONAL; INTRAMUSCULAR; INTRAVENOUS; SOFT TISSUE
Status: DISCONTINUED | OUTPATIENT
Start: 2020-10-09 | End: 2020-10-09

## 2020-10-09 RX ORDER — OXYCODONE AND ACETAMINOPHEN 5; 325 MG/1; MG/1
1 TABLET ORAL EVERY 4 HOURS PRN
Qty: 20 TABLET | Refills: 0 | Status: SHIPPED | OUTPATIENT
Start: 2020-10-09 | End: 2022-03-10 | Stop reason: CLARIF

## 2020-10-09 RX ORDER — OXYCODONE HYDROCHLORIDE 5 MG/1
5 TABLET ORAL ONCE
Status: COMPLETED | OUTPATIENT
Start: 2020-10-09 | End: 2020-10-09

## 2020-10-09 RX ORDER — CEFAZOLIN SODIUM 1 G/3ML
INJECTION, POWDER, FOR SOLUTION INTRAMUSCULAR; INTRAVENOUS
Status: DISCONTINUED | OUTPATIENT
Start: 2020-10-09 | End: 2020-10-09

## 2020-10-09 RX ORDER — ACETAMINOPHEN 500 MG
1000 TABLET ORAL
Status: DISCONTINUED | OUTPATIENT
Start: 2020-10-09 | End: 2020-10-09 | Stop reason: HOSPADM

## 2020-10-09 RX ORDER — MEPERIDINE HYDROCHLORIDE 25 MG/ML
12.5 INJECTION INTRAMUSCULAR; INTRAVENOUS; SUBCUTANEOUS ONCE AS NEEDED
Status: DISCONTINUED | OUTPATIENT
Start: 2020-10-09 | End: 2020-10-09 | Stop reason: HOSPADM

## 2020-10-09 RX ORDER — HYDROCODONE BITARTRATE AND ACETAMINOPHEN 5; 325 MG/1; MG/1
1 TABLET ORAL EVERY 4 HOURS PRN
Status: DISCONTINUED | OUTPATIENT
Start: 2020-10-09 | End: 2020-10-09 | Stop reason: HOSPADM

## 2020-10-09 RX ORDER — ONDANSETRON HYDROCHLORIDE 2 MG/ML
INJECTION, SOLUTION INTRAMUSCULAR; INTRAVENOUS
Status: DISCONTINUED | OUTPATIENT
Start: 2020-10-09 | End: 2020-10-09

## 2020-10-09 RX ORDER — FAMOTIDINE 10 MG/ML
INJECTION INTRAVENOUS
Status: DISCONTINUED | OUTPATIENT
Start: 2020-10-09 | End: 2020-10-09

## 2020-10-09 RX ORDER — PROPOFOL 10 MG/ML
VIAL (ML) INTRAVENOUS
Status: DISCONTINUED | OUTPATIENT
Start: 2020-10-09 | End: 2020-10-09

## 2020-10-09 RX ORDER — BUPIVACAINE HYDROCHLORIDE AND EPINEPHRINE 2.5; 5 MG/ML; UG/ML
INJECTION, SOLUTION EPIDURAL; INFILTRATION; INTRACAUDAL; PERINEURAL
Status: DISCONTINUED | OUTPATIENT
Start: 2020-10-09 | End: 2020-10-09 | Stop reason: HOSPADM

## 2020-10-09 RX ORDER — SODIUM CHLORIDE 0.9 % (FLUSH) 0.9 %
3 SYRINGE (ML) INJECTION
Status: DISCONTINUED | OUTPATIENT
Start: 2020-10-09 | End: 2020-10-09 | Stop reason: HOSPADM

## 2020-10-09 RX ORDER — ACETAMINOPHEN 500 MG
1000 TABLET ORAL EVERY 6 HOURS PRN
COMMUNITY
End: 2022-03-10 | Stop reason: CLARIF

## 2020-10-09 RX ORDER — LIDOCAINE HCL/PF 100 MG/5ML
SYRINGE (ML) INTRAVENOUS
Status: DISCONTINUED | OUTPATIENT
Start: 2020-10-09 | End: 2020-10-09

## 2020-10-09 RX ORDER — MIDAZOLAM HYDROCHLORIDE 1 MG/ML
INJECTION INTRAMUSCULAR; INTRAVENOUS
Status: DISCONTINUED | OUTPATIENT
Start: 2020-10-09 | End: 2020-10-09

## 2020-10-09 RX ORDER — SODIUM CHLORIDE, SODIUM LACTATE, POTASSIUM CHLORIDE, CALCIUM CHLORIDE 600; 310; 30; 20 MG/100ML; MG/100ML; MG/100ML; MG/100ML
INJECTION, SOLUTION INTRAVENOUS CONTINUOUS
Status: DISCONTINUED | OUTPATIENT
Start: 2020-10-09 | End: 2020-10-09 | Stop reason: HOSPADM

## 2020-10-09 RX ORDER — LIDOCAINE HYDROCHLORIDE 10 MG/ML
0.5 INJECTION, SOLUTION EPIDURAL; INFILTRATION; INTRACAUDAL; PERINEURAL ONCE
Status: DISCONTINUED | OUTPATIENT
Start: 2020-10-09 | End: 2020-10-09 | Stop reason: HOSPADM

## 2020-10-09 RX ORDER — HYDROMORPHONE HYDROCHLORIDE 2 MG/ML
0.4 INJECTION, SOLUTION INTRAMUSCULAR; INTRAVENOUS; SUBCUTANEOUS EVERY 5 MIN PRN
Status: COMPLETED | OUTPATIENT
Start: 2020-10-09 | End: 2020-10-09

## 2020-10-09 RX ADMIN — HYDROMORPHONE HYDROCHLORIDE 0.4 MG: 2 INJECTION INTRAMUSCULAR; INTRAVENOUS; SUBCUTANEOUS at 09:10

## 2020-10-09 RX ADMIN — SODIUM CHLORIDE, SODIUM LACTATE, POTASSIUM CHLORIDE, AND CALCIUM CHLORIDE: 600; 310; 30; 20 INJECTION, SOLUTION INTRAVENOUS at 06:10

## 2020-10-09 RX ADMIN — OXYCODONE 5 MG: 5 TABLET ORAL at 09:10

## 2020-10-09 RX ADMIN — FAMOTIDINE 20 MG: 10 INJECTION, SOLUTION INTRAVENOUS at 06:10

## 2020-10-09 RX ADMIN — MIDAZOLAM HYDROCHLORIDE 2 MG: 1 INJECTION, SOLUTION INTRAMUSCULAR; INTRAVENOUS at 07:10

## 2020-10-09 RX ADMIN — PROPOFOL 200 MG: 10 INJECTION, EMULSION INTRAVENOUS at 07:10

## 2020-10-09 RX ADMIN — DEXAMETHASONE SODIUM PHOSPHATE 8 MG: 4 INJECTION, SOLUTION INTRAMUSCULAR; INTRAVENOUS at 07:10

## 2020-10-09 RX ADMIN — HYDROMORPHONE HYDROCHLORIDE 0.4 MG: 2 INJECTION INTRAMUSCULAR; INTRAVENOUS; SUBCUTANEOUS at 08:10

## 2020-10-09 RX ADMIN — GLYCOPYRROLATE 0.2 MG: 0.2 INJECTION, SOLUTION INTRAMUSCULAR; INTRAVITREAL at 07:10

## 2020-10-09 RX ADMIN — ONDANSETRON 4 MG: 2 INJECTION, SOLUTION INTRAMUSCULAR; INTRAVENOUS at 07:10

## 2020-10-09 RX ADMIN — FENTANYL CITRATE 50 MCG: 50 INJECTION, SOLUTION INTRAMUSCULAR; INTRAVENOUS at 07:10

## 2020-10-09 RX ADMIN — LIDOCAINE HYDROCHLORIDE 80 MG: 20 INJECTION, SOLUTION INTRAVENOUS at 07:10

## 2020-10-09 RX ADMIN — PROPOFOL 50 MG: 10 INJECTION, EMULSION INTRAVENOUS at 08:10

## 2020-10-09 RX ADMIN — CARBOXYMETHYLCELLULOSE SODIUM 2 DROP: 2.5 SOLUTION/ DROPS OPHTHALMIC at 07:10

## 2020-10-09 RX ADMIN — CEFAZOLIN 2 G: 330 INJECTION, POWDER, FOR SOLUTION INTRAMUSCULAR; INTRAVENOUS at 07:10

## 2020-10-09 RX ADMIN — OXYCODONE 5 MG: 5 TABLET ORAL at 08:10

## 2020-10-09 NOTE — ANESTHESIA POSTPROCEDURE EVALUATION
Anesthesia Post Evaluation    Patient: Kecia Downing    Procedure(s) Performed: Procedure(s) (LRB):  INTERPOSITION ARTHROPLASTY, CMC JOINT (Right)  RELEASE, CARPAL TUNNEL (Right)    Final Anesthesia Type: general    Patient location during evaluation: PACU  Patient participation: Yes- Able to Participate  Level of consciousness: awake and alert  Post-procedure vital signs: reviewed and stable  Pain management: adequate  Airway patency: patent    PONV status at discharge: No PONV  Anesthetic complications: no      Cardiovascular status: blood pressure returned to baseline  Respiratory status: unassisted  Hydration status: euvolemic  Follow-up not needed.          Vitals Value Taken Time   /75 10/09/20 0931   Temp 36.6 °C (97.8 °F) 10/09/20 0843   Pulse 83 10/09/20 0941   Resp 16 10/09/20 0935   SpO2 81 % 10/09/20 0941   Vitals shown include unvalidated device data.      No case tracking events are documented in the log.      Pain/Allen Score: Pain Rating Prior to Med Admin: 9 (10/9/2020  9:35 AM)  Pain Rating Post Med Admin: 9 (10/9/2020  9:20 AM)  Allen Score: 10 (10/9/2020  9:15 AM)

## 2020-10-09 NOTE — BRIEF OP NOTE
Surgery Date: 10/09/2020  Patient Name: Kecia Downing  CSN: [id number]  Surgeon(s) and Role:    Claude S. Williams IV, MD - Primary    Pre-op Diagnosis:  Osteoarthritis of thumb, right [M18.11]  Carpal tunnel syndrome on right [G56.01]    Post-op Diagnosis:  Osteoarthritis of thumb, right [M18.11]  Carpal tunnel syndrome on right [G56.01]    Procedure(s) (LRB):  INTERPOSITION ARTHROPLASTY, CMC JOINT (Right)  RELEASE, CARPAL TUNNEL (Right)    INDICATIONS: This patient has had pain at the trapezial metacarpal joint with significant crepitation and pain on circumduction.  Her activities have been significantly limited and she has not responded to conservative measures including splinting and injections.  Additionally she has had pain  and numbness  throughout the median nerve distribution with significant numbness at night and certain positions of her hand. After the potential benefits as well as potential risks and   complications of operative carpal trapezii ectomy and LRTI as well as decompression of the carpal canal were reviewed with   the patient, she has elected to undergo the above procedure.     PROCEDURE IN DETAIL: After proper informed consent, the patient was transported   to the Operating Suite.  General endotracheal anesthesia was administered.  The right hand was thoroughly prepped with alcohol,   Betadine and draped in the usual sterile fashion. Preoperative routine timeout   was taken, and the operative site was identified by the operative team. After   Esmarch exsanguination, a padded upper arm tourniquet was then elevated to 250 mmHg.  A longitudinal incision was then made over the radial aspect of the base of the right thumb and careful dissection was carried down through the subdermal tissue using bipolar cautery for hemostasis.  Care was taken to protect the cutaneous nerve branches.  The extensor tendons were retracted and the capsule was divided longitudinally.  The capsular flaps were  elevated from the trapezium and the trapezium was then divided using an osteotome and removed using a rongeur.  The image intensifier confirmed complete excision of the trapezium.  Flexor carpi radialis tendon was divided through a separate incision through the distal forearm and withdrawn into the distal wound.  The FCR tendon was then passed through the base of the thumb metacarpal using the cannulated drill bone tunnel and secured with an Arthrex absorbable screw.  Operative findings included complete eburnation of the trapezium metacarpal joint.  Once the FCR suspension plasty was completed the thumb metacarpal was very stable.  This was demonstrated using the image intensifier.  The capsular flaps were then closed with interrupted figure 8 suture after the remaining FCR tendon was placed as an anchovy in the wound.  An incision was then made in the right palm in line with the radial border of the ring finger and careful dissection was carried down through the subdermal tissue using bipolar cautery for hemostasis.  The palmar fascia was divided and the transverse carpal ligament was identified.  The ligament was divided longitudinally using a 15.  Blade scalpel completely to the distal margin.  The contents of the carpal canal were then freed from the deep surface of the transverse carpal ligament. A carpal tunnel guide was then used to protect the contents of the carpal canal. A carpal tunnel knife was then used to further divide the transverse carpal ligament longitudinally from distal to proximal to approximately 3 cm proximal to the wrist crease under direct visualization.  The median nerve was then inspected and found to be fully decompressed proximally and distally including the motor branch.  There was slight narrowing of the nerve at the level of the carpal canal. No masses or other pathology was noted.  There was minimal synovial proliferation.  The wound was irrigated and hemostasis was confirmed.  The  wounds were then infiltrated with 0.25% Marcaine with epinephrine . The incision was then closed with nylon interrupted suture.  The radial wound was closed with Monocryl and 4 0 Prolene subcuticular suture and Dermabond.  A sterile soft dressing was then applied.  A thumb spica splint was applied.  There is normal circulation throughout the hand after deflation of the tourniquet and application of the splint.  The patient was awakened in the operative suite and taken to the recovery area in stable condition. The procedure was tolerated very well.  Lap instrument and needle counts were correct.        COMPLICATIONS: None.      Anesthesia: General    Estimated Blood Loss: minimal         Specimens     None          Discharge Note    SUMMARY     Admit Date: 10/9/2020    Discharge Date and Time:  10/09/2020 8:48 AM    Hospital Course (synopsis of major diagnoses, care, treatment, and services provided during the course of the hospital stay):  Unremarkable     Final Diagnosis: Post-Op Diagnosis Codes:     * Osteoarthritis of thumb, right [M18.11]     * Carpal tunnel syndrome on right [G56.01]    Disposition: Home or Self Care    Follow Up/Patient Instructions:     Medications:  Reconciled Home Medications:      Medication List      START taking these medications    oxyCODONE-acetaminophen 5-325 mg per tablet  Commonly known as: PERCOCET  Take 1 tablet by mouth every 4 (four) hours as needed for Pain.        CONTINUE taking these medications    acetaminophen 500 MG tablet  Commonly known as: TYLENOL  Take 1,000 mg by mouth every 6 (six) hours as needed for Pain.     AJOVY SYRINGE SUBQ  Inject into the skin every 30 days.     APTIOM 200 mg Tab  Generic drug: eslicarbazepine  Take 400 mg by mouth.     oxyCODONE 10 mg 12 hr tablet  Commonly known as: OXYCONTIN  Take 10 mg by mouth every 12 (twelve) hours as needed for Pain.     pantoprazole 40 MG tablet  Commonly known as: PROTONIX  Take 40 mg by mouth once daily.      prochlorperazine 10 MG tablet  Commonly known as: COMPAZINE  Take 10 mg by mouth 3 (three) times daily.     propranoloL 60 MG tablet  Commonly known as: INDERAL  Take 60 mg by mouth 3 (three) times daily.     simvastatin 20 MG tablet  Commonly known as: ZOCOR  Take 20 mg by mouth every evening.     traZODone 50 MG tablet  Commonly known as: DESYREL  Take 50 mg by mouth every evening.     ZONEGRAN 100 MG Cap  Generic drug: zonisamide  Take 200 mg by mouth.          Discharge Procedure Orders   COVID-19 Routine Screening   Standing Status: Future Number of Occurrences: 1 Standing Exp. Date: 11/29/21     Order Specific Question Answer Comments   Is the patient symptomatic? No    Is this needed for pre-procedure or pre-op testing? Yes    Diagnosis: Preop testing [845168]      Diet general     Call MD for:  temperature >100.4     Call MD for:  persistent nausea and vomiting     Call MD for:  severe uncontrolled pain     Call MD for:  difficulty breathing, headache or visual disturbances     Call MD for:  redness, tenderness, or signs of infection (pain, swelling, redness, odor or green/yellow discharge around incision site)     Call MD for:  hives     Call MD for:  persistent dizziness or light-headedness     Call MD for:  extreme fatigue     Leave dressing on - Keep it clean, dry, and intact until clinic visit     Keep surgical extremity elevated     Lifting restrictions     No driving, operating heavy equipment or signing legal documents while taking pain medication     Follow-up Information     Claude S [facility] In 1 week.    Specialty: Orthopedic Surgery  Why: For wound re-check  Contact information:  [address] [place] [id number] [phone]

## 2020-10-09 NOTE — PLAN OF CARE
Kecia Downing has met all discharge criteria from Phase II. Vital Signs are stable, ambulating  without difficulty. Discharge instructions given, patient verbalized understanding.

## 2020-10-09 NOTE — ANESTHESIA PROCEDURE NOTES
Intubation  Performed by: Ethel Vick CRNA  Authorized by: Antonio Palma MD     Intubation:     Induction:  Intravenous    Intubated:  Postinduction    Mask Ventilation:  Easy mask    Attempts:  2    Attempted By:  CRNA    Method of Intubation:  Direct    Attempted By (2nd Attempt):  CRNA    Method of Intubation (2nd Attempt):  Direct    Difficult Airway Encountered?: No      Complications:  None    Airway Device:  Supraglottic airway/LMA and other (see comments) (4.5 i gel attempted first without a good seal; i gel LMA removed and LMA unique placed; no leak and positive etCO2)    Airway Device Size:  4.0    Style/Cuff Inflation:  Cuffed (inflated to minimal occlusive pressure)    Secured at:  The lips    Placement Verified By:  Capnometry    Complicating Factors:  None    Findings Post-Intubation:  BS equal bilateral and atraumatic/condition of teeth unchanged

## 2020-10-09 NOTE — INTERVAL H&P NOTE
The patient has been examined and the H&P has been reviewed:    I concur with the findings and no changes have occurred since H&P was written.    Surgery risks, benefits and alternative options discussed and understood by patient/family.          Active Hospital Problems    Diagnosis  POA    Carpal tunnel syndrome on right [G56.01]  Yes      Resolved Hospital Problems   No resolved problems to display.

## 2020-10-11 ENCOUNTER — HOSPITAL ENCOUNTER (EMERGENCY)
Facility: OTHER | Age: 60
Discharge: HOME | End: 2020-10-11
Attending: EMERGENCY MEDICINE
Payer: MEDICARE

## 2020-10-11 VITALS
BODY MASS INDEX: 31.71 KG/M2 | RESPIRATION RATE: 19 BRPM | HEART RATE: 57 BPM | OXYGEN SATURATION: 97 % | SYSTOLIC BLOOD PRESSURE: 144 MMHG | WEIGHT: 202 LBS | TEMPERATURE: 98 F | DIASTOLIC BLOOD PRESSURE: 66 MMHG | HEIGHT: 67 IN

## 2020-10-11 DIAGNOSIS — M79.89 SWELLING OF RIGHT HAND: ICD-10-CM
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
## 2020-10-11 DIAGNOSIS — G89.18 POST-OPERATIVE PAIN: Primary | ICD-10-CM

## 2020-10-11 LAB
HCV AB SERPL QL IA: NEGATIVE
HIV 1+2 AB+HIV1 P24 AG SERPL QL IA: NEGATIVE

## 2020-10-11 PROCEDURE — 63600175 PHARM REV CODE 636 W HCPCS: Performed by: PHYSICIAN ASSISTANT

## 2020-10-11 PROCEDURE — 86803 HEPATITIS C AB TEST: CPT

## 2020-10-11 PROCEDURE — 96372 THER/PROPH/DIAG INJ SC/IM: CPT

## 2020-10-11 PROCEDURE — 25000003 PHARM REV CODE 250: Performed by: PHYSICIAN ASSISTANT

## 2020-10-11 PROCEDURE — 99284 EMERGENCY DEPT VISIT MOD MDM: CPT | Mod: 25

## 2020-10-11 PROCEDURE — 86703 HIV-1/HIV-2 1 RESULT ANTBDY: CPT

## 2020-10-11 RX ORDER — KETOROLAC TROMETHAMINE 30 MG/ML
30 INJECTION, SOLUTION INTRAMUSCULAR; INTRAVENOUS
Status: COMPLETED | OUTPATIENT
Start: 2020-10-11 | End: 2020-10-11

## 2020-10-11 RX ORDER — OXYCODONE HYDROCHLORIDE 5 MG/1
5 TABLET ORAL
Status: COMPLETED | OUTPATIENT
Start: 2020-10-11 | End: 2020-10-11

## 2020-10-11 RX ORDER — HYDROMORPHONE HYDROCHLORIDE 1 MG/ML
1 INJECTION, SOLUTION INTRAMUSCULAR; INTRAVENOUS; SUBCUTANEOUS
Status: COMPLETED | OUTPATIENT
Start: 2020-10-11 | End: 2020-10-11

## 2020-10-11 RX ORDER — HYDROMORPHONE HYDROCHLORIDE 1 MG/ML
1 INJECTION, SOLUTION INTRAMUSCULAR; INTRAVENOUS; SUBCUTANEOUS
Status: DISCONTINUED | OUTPATIENT
Start: 2020-10-11 | End: 2020-10-11

## 2020-10-11 RX ADMIN — OXYCODONE 5 MG: 5 TABLET ORAL at 12:10

## 2020-10-11 RX ADMIN — HYDROMORPHONE HYDROCHLORIDE 1 MG: 1 INJECTION, SOLUTION INTRAMUSCULAR; INTRAVENOUS; SUBCUTANEOUS at 01:10

## 2020-10-11 RX ADMIN — KETOROLAC TROMETHAMINE 30 MG: 30 INJECTION, SOLUTION INTRAMUSCULAR at 12:10

## 2020-10-11 NOTE — ED NOTES
New cast padding applied to R wrist, Secured with ace wraps. Pt instructed on proper cast care and educated about possible signs of poor perfusion. Pt and spouse verbalized understanding.

## 2020-10-11 NOTE — ED NOTES
"Pt to ED with c/o R wrist pain after a fall last PM. Denies LOC, unsure if she hit her head. Denies blood thinner use. Recent carpal tunnel release surgery on 10/09. Pt c/o swelling and increased pain to R wrist since fall. Pt states "The pain never dropped below an 8 out of 10 when I left after the surgery". Ace wrap and cast padding removed by PA to observe R wrist. Moderate swelling noted to R wrist, 2+ radial pulse, sensation intact, cap refill <3 sec. Limited ROM secondary to pain. Pt denies relief from prescribed medications and ice application. Pt connected to continuous cardiac monitoring, pulse ox, BP cycled. Will continue to monitor.     Two patient identifiers have been checked and are correct.      Appearance: Pt awake, alert & oriented to person, place & time. Pt in no acute distress at present time. Pt is clean and well groomed with clothes appropriately fastened.   Skin: Skin warm, dry & intact. Color consistent with ethnicity. Mucous membranes moist. Bruising noted to R wrist.   Musculoskeletal: SEE HPI. Ambulatory with cane.   Respiratory: Respirations spontaneous, even, and non-labored. Visible chest rise noted. Airway is open and patent. No accessory muscle use noted.   Neurologic: Sensation is intact. Speech is clear and appropriate. Eyes open spontaneously, behavior appropriate to situation, follows commands, facial expression symmetrical, purposeful motor response noted.  Cardiac: All peripheral pulses present. No Bilateral lower extremity edema. Cap refill is <3 seconds.  Abdomen: Abdomen soft, non-tender to palpation.   : Pt reports no dysuria or hematuria.   "

## 2020-10-11 NOTE — ED PROVIDER NOTES
Encounter Date: 10/11/2020       History     Chief Complaint   Patient presents with    Post-op Problem     pt with c/o fall and      Patient is a 60-year-old female with arthritis who is status post day 2 of carpal release and CMC joint of right hand who presents to the emergency department with right hand pain and swelling.  Patient states she has had continuous pain since her surgery, he states her pain is not decreased less than 8/10.  Patient states she had a trip and fall yesterday, landing backwards onto her elbows and hitting the back of her head.  She denies LOC.  Patient states her right arm was in a sling when this happened.  She states since the fall, her hand swelling and pain has increased.  She is noticing bruising to her right hand.  She reports no relief of pain with her Percocet.  She reports tingling but denies numbness to her right hand.  She is not on blood thinners.    The history is provided by the patient.     Review of patient's allergies indicates:   Allergen Reactions    Chlorhexidine gluconate Hives    Betadine surgi-prep Rash     Past Medical History:   Diagnosis Date    Arthritis     Migraines 2015    PONV (postoperative nausea and vomiting)      Past Surgical History:   Procedure Laterality Date    ANKLE SURGERY      right    SHOULDER SURGERY      left    TOTAL KNEE ARTHROPLASTY Bilateral 2007    right Jan. 2007; left Jan 2010     History reviewed. No pertinent family history.  Social History     Tobacco Use    Smoking status: Never Smoker    Smokeless tobacco: Never Used   Substance Use Topics    Alcohol use: No    Drug use: Never     Review of Systems   Constitutional: Negative for chills and fever.   Respiratory: Negative for shortness of breath.    Cardiovascular: Negative for chest pain.   Gastrointestinal: Negative for abdominal pain, nausea and vomiting.   Musculoskeletal: Positive for arthralgias and joint swelling.   Skin: Positive for color change and wound.  Negative for rash.   Allergic/Immunologic: Negative for immunocompromised state.   Neurological: Negative for numbness.        Tingling to right hand without numbness       Physical Exam     Initial Vitals [10/11/20 1133]   BP Pulse Resp Temp SpO2   130/73 70 18 97.1 °F (36.2 °C) 97 %      MAP       --         Physical Exam    Constitutional: Vital signs are normal. She is cooperative. No distress.   HENT:   Head: Normocephalic and atraumatic.   Eyes: Conjunctivae and EOM are normal.   Neck: Normal range of motion. Neck supple.   Cardiovascular: Normal rate, regular rhythm and intact distal pulses.   Pulses:       Radial pulses are 2+ on the right side.   Pulmonary/Chest: Breath sounds normal. No respiratory distress.   Musculoskeletal:      Comments: Right upper extremity was immobilized in a thumb spica splint, cast padding was removed to visualize forearm.  Diffuse edema to the dorsal and volar aspect of the wrist, and to the dorsal aspect of the hand.  Compartments are soft and compressible.  Mild bony tenderness to the dorsal aspect of the wrist and hand.  Range of motion intact to all digits.  No CT L midline tenderness, crepitus, masses, step-offs or deformities.  Patient moving all extremities without difficulty.   Neurological: She is alert and oriented to person, place, and time. GCS eye subscore is 4. GCS verbal subscore is 5. GCS motor subscore is 6.   Skin: Skin is warm and dry. Capillary refill takes less than 2 seconds. No rash noted.   Large, 6 cm ecchymoses to the posterior upper arm  Ecchymoses to the dorsal aspect of the right hand  Surgical incision with sutures in place along the right, lateral aspect of the thumb is clean, dry intact,   Psychiatric: She has a normal mood and affect. Her behavior is normal. Thought content normal.         ED Course   Procedures  Labs Reviewed   HIV 1 / 2 ANTIBODY   HEPATITIS C ANTIBODY          Imaging Results          X-Ray Wrist Complete Right (Final result)   Result time 10/11/20 12:40:51    Final result by Beatirce Salinas MD (10/11/20 12:40:51)                 Impression:      As above.      Electronically signed by: Beatrice Salinas MD  Date:    10/11/2020  Time:    12:40             Narrative:    EXAMINATION:  XR WRIST COMPLETE 3 VIEWS RIGHT; XR HAND COMPLETE 3 VIEW RIGHT    CLINICAL HISTORY:  fall; Unspecified fall, initial encounter    TECHNIQUE:  PA, lateral, and oblique views of the right wrist and hand were performed.    COMPARISON:  None    FINDINGS:  Small amount of subcutaneous emphysema within the volar soft tissues of the wrist.  There is also soft tissue swelling at the volar margin of the wrist.  Chart review reveals recent carpal tunnel release surgery and trapeziectomy.  There are a few well corticated ossific densities adjacent to the base of the 1st carpal metacarpal joint space which are felt to be chronic in nature rather than related to an acute process.  No definite fracture or dislocation.                               X-Ray Hand 3 View Right (Final result)  Result time 10/11/20 12:40:51   Procedure changed from X-Ray Hand 2 View Right     Final result by Beatrice Salinas MD (10/11/20 12:40:51)                 Impression:      As above.      Electronically signed by: Beatrice Salinas MD  Date:    10/11/2020  Time:    12:40             Narrative:    EXAMINATION:  XR WRIST COMPLETE 3 VIEWS RIGHT; XR HAND COMPLETE 3 VIEW RIGHT    CLINICAL HISTORY:  fall; Unspecified fall, initial encounter    TECHNIQUE:  PA, lateral, and oblique views of the right wrist and hand were performed.    COMPARISON:  None    FINDINGS:  Small amount of subcutaneous emphysema within the volar soft tissues of the wrist.  There is also soft tissue swelling at the volar margin of the wrist.  Chart review reveals recent carpal tunnel release surgery and trapeziectomy.  There are a few well corticated ossific densities adjacent to the base of the 1st carpal metacarpal joint space  which are felt to be chronic in nature rather than related to an acute process.  No definite fracture or dislocation.                                 Medical Decision Making:   Initial Assessment:   Urgent evaluation of a 60 y.o. female presenting to the emergency department complaining of right wrist pain, hand and swelling. Patient is afebrile, nontoxic appearing and hemodynamically stable.  -no obvious deformity on exam.  Limb is distally neurovascular intact with some reduced range of motion due to swelling and pain.  No signs of compartment syndrome or bacterial infection.  -once casting removed, patient's symptoms improved.  -will obtain x-ray, plan to touch base with Dr. Claude Williams  Clinical Tests:   Radiological Study: Ordered and Reviewed  ED Management:  X-ray wrist and hand reveals small amount of subcutaneous emphysema and soft tissue swelling at the volar margin of the wrist.  No signs of fracture dislocation.  -patient's pain did not improve with Toradol and Oxycodone given.   -she was given Dilaudid.    -I did discuss case with Dr. karissa Segundo, states patient may increase Percocet to 10 mg.  He will see patient in clinic tomorrow.  Splint was rewrapped with looser cast padding.  -patient no other complaints today and was stable at discharge.                             Clinical Impression:       ICD-10-CM ICD-9-CM   1. Post-operative pain  G89.18 338.18   2. Fall  W19.XXXA E888.9   3. Swelling of right hand  M79.89 729.81                                               Samson Rosario PA-C  10/11/20 [id number]

## 2021-01-22 ENCOUNTER — PATIENT MESSAGE (OUTPATIENT)
Dept: ADMINISTRATIVE | Facility: OTHER | Age: 61
End: 2021-01-22

## 2022-03-10 ENCOUNTER — HOSPITAL ENCOUNTER (OUTPATIENT)
Dept: PREADMISSION TESTING | Facility: OTHER | Age: 62
Discharge: HOME | End: 2022-03-10
Attending: ORTHOPAEDIC SURGERY
Payer: MEDICARE

## 2022-03-10 ENCOUNTER — ANESTHESIA EVENT (OUTPATIENT)
Dept: SURGERY | Facility: OTHER | Age: 62
End: 2022-03-10
Payer: MEDICARE

## 2022-03-10 VITALS
HEART RATE: 88 BPM | BODY MASS INDEX: 33.74 KG/M2 | HEIGHT: 67 IN | DIASTOLIC BLOOD PRESSURE: 81 MMHG | SYSTOLIC BLOOD PRESSURE: 140 MMHG | OXYGEN SATURATION: 94 % | WEIGHT: 215 LBS

## 2022-03-10 RX ORDER — PROPRANOLOL HYDROCHLORIDE 20 MG/1
20 TABLET ORAL 2 TIMES DAILY
COMMUNITY
Start: 2022-02-13

## 2022-03-10 RX ORDER — ONDANSETRON HYDROCHLORIDE 8 MG/1
TABLET, FILM COATED ORAL
COMMUNITY
Start: 2022-02-10

## 2022-03-10 RX ORDER — ACETAMINOPHEN 500 MG
1000 TABLET ORAL
Status: CANCELLED | OUTPATIENT
Start: 2022-03-10 | End: 2022-03-10

## 2022-03-10 RX ORDER — KETOROLAC TROMETHAMINE 10 MG/1
TABLET, FILM COATED ORAL DAILY PRN
Status: ON HOLD | COMMUNITY
Start: 2021-09-23 | End: 2022-10-06 | Stop reason: HOSPADM

## 2022-03-10 RX ORDER — ALBUTEROL SULFATE 90 UG/1
AEROSOL, METERED RESPIRATORY (INHALATION)
COMMUNITY
Start: 2022-02-10

## 2022-03-10 RX ORDER — CARBAMAZEPINE 200 MG/1
200 TABLET ORAL 3 TIMES DAILY
COMMUNITY

## 2022-03-10 RX ORDER — CLONAZEPAM 0.5 MG/1
0.5 TABLET ORAL DAILY
COMMUNITY
Start: 2022-01-17

## 2022-03-10 RX ORDER — OXYCODONE HYDROCHLORIDE 10 MG/1
10 TABLET ORAL
Status: ON HOLD | COMMUNITY
Start: 2022-03-01 | End: 2022-10-06 | Stop reason: HOSPADM

## 2022-03-10 RX ORDER — BUTALBITAL, ACETAMINOPHEN AND CAFFEINE 300; 40; 50 MG/1; MG/1; MG/1
CAPSULE ORAL DAILY PRN
COMMUNITY
Start: 2019-05-25

## 2022-03-10 RX ORDER — LIDOCAINE HYDROCHLORIDE 10 MG/ML
0.5 INJECTION, SOLUTION EPIDURAL; INFILTRATION; INTRACAUDAL; PERINEURAL ONCE
Status: CANCELLED | OUTPATIENT
Start: 2022-03-10 | End: 2022-03-10

## 2022-03-10 RX ORDER — SODIUM CHLORIDE, SODIUM LACTATE, POTASSIUM CHLORIDE, CALCIUM CHLORIDE 600; 310; 30; 20 MG/100ML; MG/100ML; MG/100ML; MG/100ML
INJECTION, SOLUTION INTRAVENOUS CONTINUOUS
Status: CANCELLED | OUTPATIENT
Start: 2022-03-10

## 2022-03-10 RX ORDER — BUPROPION HYDROCHLORIDE 300 MG/1
300 TABLET ORAL DAILY
COMMUNITY
Start: 2021-12-07

## 2022-03-10 RX ORDER — TRAMADOL HYDROCHLORIDE 50 MG/1
50 TABLET ORAL DAILY
Status: ON HOLD | COMMUNITY
Start: 2022-02-21 | End: 2022-10-06 | Stop reason: HOSPADM

## 2022-03-10 RX ORDER — GABAPENTIN 300 MG/1
CAPSULE ORAL
COMMUNITY

## 2022-03-10 RX ORDER — TIZANIDINE 4 MG/1
4 TABLET ORAL
COMMUNITY
Start: 2021-10-28

## 2022-03-10 NOTE — ANESTHESIA PREPROCEDURE EVALUATION
03/10/2022  Kecia Downing is a 61 y.o., female.    Pre-op Assessment    I have reviewed the Patient Summary Reports.    I have reviewed the Nursing Notes. I have reviewed the NPO Status.   I have reviewed the Medications.     Review of Systems  Anesthesia Hx:  History of prior surgery of interest to airway management or planning: Previous anesthesia: General 10/20 CMC joint (see notes for LMA details) with general anesthesia.  Airway issues documented on chart review include mask, easy, laryngeal mask airway used  Denies Family Hx of Anesthesia complications.   Denies Personal Hx of Anesthesia complications.   Social:  Non-Smoker    Hematology/Oncology:  Hematology Normal   Oncology Normal     EENT/Dental:EENT/Dental Normal   Cardiovascular:   Denies Hypertension.     Pulmonary:   Asthma (inhaler use with URIs only)    Renal/:  Renal/ Normal     Hepatic/GI:   GERD, well controlled    Musculoskeletal:   Arthritis   Spine Disorders: lumbar and cervical Chronic Pain    Neurological:   Headaches (rare migraine on prophylaxis, Ajovy injections, inderal)    Endocrine:  Endocrine Normal  Obesity / BMI > 30  Dermatological:  Skin Normal    Psych:  Psychiatric Normal           Physical Exam  General:  Obesity      Airway/Jaw/Neck:  Mallampati: II TM Distance: Normal   Neck ROM: Normal ROM       Dental:  Dental Findings: In tact           Mental Status:  Mental Status Findings:  Cooperative, Alert and Oriented         Anesthesia Plan  Type of Anesthesia, risks & benefits discussed:  Anesthesia Type:  general    Patient's Preference:   Plan Factors:          Intra-op Monitoring Plan: Standard ASA Monitors  Intra-op Monitoring Plan Comments:   Post Op Pain Control Plan: per primary service following discharge from PACU and peripheral nerve block  Post Op Pain Control Plan Comments:     Induction:   IV  Beta  Blocker:         Informed Consent: Informed consent signed with the Patient and all parties understand the risks and agree with anesthesia plan.  All questions answered.  Anesthesia consent signed with patient.  ASA Score: 2     Day of Surgery Review of History & Physical:        Anesthesia Plan Notes: Pt states had severe pain following CMC procedure. Discussed ISB for pain control for shoulder        Ready For Surgery From Anesthesia Perspective.           Physical Exam  General: Obesity    Airway:  Mallampati: II   TM Distance: Normal  Neck ROM: Normal ROM    Dental:  In tact          Anesthesia Plan  Type of Anesthesia, risks & benefits discussed:    Anesthesia Type: general  Intra-op Monitoring Plan: Standard ASA Monitors  Post Op Pain Control Plan: per primary service following discharge from PACU and peripheral nerve block  Induction:  IV  Informed Consent: Informed consent signed with the Patient and all parties understand the risks and agree with anesthesia plan.  All questions answered.   ASA Score: 2  Anesthesia Plan Notes: Pt states had severe pain following CMC procedure. Discussed ISB for pain control for shoulder    Ready For Surgery From Anesthesia Perspective.       .

## 2022-03-10 NOTE — DISCHARGE INSTRUCTIONS
Information to Prepare you for your Surgery    PRE-ADMIT TESTING -  [phone]    [address] [facility]          Your surgery has been scheduled at [facility]. We are pleased to have the opportunity to serve you. For Further Information please call [phone].    On the day of surgery please report to the Information Desk on the 1st floor.    CONTACT YOUR PHYSICIAN'S OFFICE THE DAY PRIOR TO YOUR SURGERY TO OBTAIN YOUR ARRIVAL TIME.     The evening before surgery do not eat anything after 9 p.m. ( this includes hard candy, chewing gum and mints).  You may only have GATORADE, POWERADE AND WATER  from 9 p.m. until you leave your home.   DO NOT DRINK ANY LIQUIDS ON THE WAY TO THE HOSPITAL.      Why does your anesthesiologist allow you to drink Gatorade/Powerade before surgery?  Gatorade/Powerade helps to increase your comfort before surgery and to decrease your nausea after surgery. The carbohydrates in Gatorade/Powerade help reduce your body's stress response to surgery.  If you are a diabetic-drink only water prior to surgery.      Current Visitor policy(12/27/2021) - Patients may have 1 adult visitor pre and post procedure. Only 1 visitor will be allowed in the Surgical building with the patient.     SPECIAL MEDICATION INSTRUCTIONS: TAKE medications checked off by the Anesthesiologist on your Medication List.    Angiogram Patients: Take medications as instructed by your physician, including aspirin.     Surgery Patients:    If you take ASPIRIN - Your PHYSICIAN/SURGEON will need to inform you IF/OR when you need to stop taking aspirin prior to your surgery.     Do Not take any medications containing IBUPROFEN.    Do Not Wear any make-up (especially eye make-up) to surgery. Please remove any false eyelashes or eyelash extensions. If you arrive the day of surgery with makeup/eyelashes on you will be required to remove prior to surgery. (There is a risk of  corneal abrasions if eye makeup/eyelash extensions are not removed)      Leave all valuables at home.   Do Not wear any jewelry or watches, including any metal in body piercings. Jewelry must be removed prior to coming to the hospital.  There is a possibility that rings that are unable to be removed may be cut off if they are on the surgical extremity.    Please remove all hair extensions, wigs, clips and any other metal accessories/ ornaments from your hair.  These items may pose a flammable/fire risk in Surgery and must be removed.    Do not shave your surgical area at least 5 days prior to your surgery. The surgical prep will be performed at the hospital according to Infection Control regulations.    Contact Lens must be removed before surgery. Either do not wear the contact lens or bring a case and solution for storage.  Please bring a container for eyeglasses or dentures as required.  Bring any paperwork your physician has provided, such as consent forms,  history and physicals, doctor's orders, etc.   Bring comfortable clothes that are loose fitting to wear upon discharge. Take into consideration the type of surgery being performed.  Maintain your diet as advised per your physician the day prior to surgery.      Adequate rest the night before surgery is advised.   Park in the Parking lot behind the hospital or in the [employer] [facility] across the street from the parking lot. Parking is complimentary.  If you will be discharged the same day as your procedure, please arrange for a responsible adult to drive you home or to accompany you if traveling by taxi.   YOU WILL NOT BE PERMITTED TO DRIVE OR TO LEAVE THE HOSPITAL ALONE AFTER SURGERY.   If you are being discharged the same day, it is strongly recommended that you arrange for someone to remain with you for the first 24 hrs following your surgery.    The Surgeon will speak to your family/visitor after your surgery regarding the outcome of your surgery and  post op care.  The Surgeon may speak to you after your surgery, but there is a possibility you may not remember the details.  Please check with your family members regarding the conversation with the Surgeon.    We strongly recommend whoever is bringing you home be present for discharge instructions.  This will ensure a thorough understanding for your post op home care.    ALL CHILDREN MUST ALWAYS BE ACCOMPANIED BY AN ADULT.    Visitors-Refer to current Visitor policy handouts.    Thank you for your cooperation.  The Staff of [facility].            Bathing Instructions with Hibiclens    Shower the evening before and morning of your procedure with Hibiclens:  Wash your face with water and your regular face wash/soap  Apply Hibiclens directly on your skin or on a wet washcloth and wash gently. When showering: Move away from the shower stream when applying Hibiclens to avoid rinsing off too soon.  Rinse thoroughly with warm water  Do not dilute Hibiclens        Dry off as usual, do not use any deodorant, powder, body lotions, perfume, after shave or cologne.

## 2022-03-22 ENCOUNTER — ANESTHESIA (OUTPATIENT)
Dept: SURGERY | Facility: OTHER | Age: 62
End: 2022-03-22
Payer: MEDICARE

## 2022-03-22 ENCOUNTER — HOSPITAL ENCOUNTER (OUTPATIENT)
Facility: OTHER | Age: 62
Discharge: HOME | End: 2022-03-22
Attending: ORTHOPAEDIC SURGERY | Admitting: ORTHOPAEDIC SURGERY
Payer: MEDICARE

## 2022-03-22 VITALS
DIASTOLIC BLOOD PRESSURE: 51 MMHG | OXYGEN SATURATION: 95 % | BODY MASS INDEX: 33.67 KG/M2 | TEMPERATURE: 98 F | HEART RATE: 81 BPM | SYSTOLIC BLOOD PRESSURE: 104 MMHG | WEIGHT: 215 LBS | RESPIRATION RATE: 16 BRPM

## 2022-03-22 DIAGNOSIS — Z01.818 PRE-OP TESTING: ICD-10-CM

## 2022-03-22 DIAGNOSIS — M75.111 INCOMPLETE ROTATOR CUFF TEAR OR RUPTURE OF RIGHT SHOULDER, NOT SPECIFIED AS TRAUMATIC: Primary | ICD-10-CM

## 2022-03-22 PROBLEM — S43.431A SUPERIOR GLENOID LABRUM LESION OF RIGHT SHOULDER: Status: ACTIVE | Noted: 2022-03-22

## 2022-03-22 PROBLEM — M75.41 IMPINGEMENT SYNDROME OF RIGHT SHOULDER: Status: ACTIVE | Noted: 2022-03-22

## 2022-03-22 PROCEDURE — 71000016 HC POSTOP RECOV ADDL HR: Performed by: ORTHOPAEDIC SURGERY

## 2022-03-22 PROCEDURE — 63600175 PHARM REV CODE 636 W HCPCS: Performed by: ANESTHESIOLOGY

## 2022-03-22 PROCEDURE — 63600175 PHARM REV CODE 636 W HCPCS: Performed by: ORTHOPAEDIC SURGERY

## 2022-03-22 PROCEDURE — 25000003 PHARM REV CODE 250: Performed by: NURSE ANESTHETIST, CERTIFIED REGISTERED

## 2022-03-22 PROCEDURE — 36000710: Performed by: ORTHOPAEDIC SURGERY

## 2022-03-22 PROCEDURE — 71000039 HC RECOVERY, EACH ADD'L HOUR: Performed by: ORTHOPAEDIC SURGERY

## 2022-03-22 PROCEDURE — P9045 ALBUMIN (HUMAN), 5%, 250 ML: HCPCS | Mod: JG | Performed by: ANESTHESIOLOGY

## 2022-03-22 PROCEDURE — C1713 ANCHOR/SCREW BN/BN,TIS/BN: HCPCS | Performed by: ORTHOPAEDIC SURGERY

## 2022-03-22 PROCEDURE — 71000033 HC RECOVERY, INTIAL HOUR: Performed by: ORTHOPAEDIC SURGERY

## 2022-03-22 PROCEDURE — 36000711: Performed by: ORTHOPAEDIC SURGERY

## 2022-03-22 PROCEDURE — 25000003 PHARM REV CODE 250: Performed by: ORTHOPAEDIC SURGERY

## 2022-03-22 PROCEDURE — 37000008 HC ANESTHESIA 1ST 15 MINUTES: Performed by: ORTHOPAEDIC SURGERY

## 2022-03-22 PROCEDURE — 63600175 PHARM REV CODE 636 W HCPCS

## 2022-03-22 PROCEDURE — 71000015 HC POSTOP RECOV 1ST HR: Performed by: ORTHOPAEDIC SURGERY

## 2022-03-22 PROCEDURE — 63600175 PHARM REV CODE 636 W HCPCS: Performed by: NURSE ANESTHETIST, CERTIFIED REGISTERED

## 2022-03-22 PROCEDURE — 27201423 OPTIME MED/SURG SUP & DEVICES STERILE SUPPLY: Performed by: ORTHOPAEDIC SURGERY

## 2022-03-22 PROCEDURE — 76942 ECHO GUIDE FOR BIOPSY: CPT | Performed by: ANESTHESIOLOGY

## 2022-03-22 PROCEDURE — 25000003 PHARM REV CODE 250: Performed by: ANESTHESIOLOGY

## 2022-03-22 PROCEDURE — 37000009 HC ANESTHESIA EA ADD 15 MINS: Performed by: ORTHOPAEDIC SURGERY

## 2022-03-22 DEVICE — ANCHOR FIBERTAK SOFT 2.6: Type: IMPLANTABLE DEVICE | Site: SHOULDER | Status: FUNCTIONAL

## 2022-03-22 DEVICE — ANCHOR BIOCOMP SWVLLOK: Type: IMPLANTABLE DEVICE | Site: SHOULDER | Status: FUNCTIONAL

## 2022-03-22 RX ORDER — ONDANSETRON 2 MG/ML
4 INJECTION INTRAMUSCULAR; INTRAVENOUS ONCE
Status: COMPLETED | OUTPATIENT
Start: 2022-03-22 | End: 2022-03-22

## 2022-03-22 RX ORDER — FENTANYL CITRATE 50 UG/ML
INJECTION, SOLUTION INTRAMUSCULAR; INTRAVENOUS
Status: DISCONTINUED | OUTPATIENT
Start: 2022-03-22 | End: 2022-03-22

## 2022-03-22 RX ORDER — MINOCYCLINE HYDROCHLORIDE 100 MG/1
100 CAPSULE ORAL EVERY 12 HOURS
Qty: 42 CAPSULE | Refills: 0 | Status: ON HOLD | OUTPATIENT
Start: 2022-03-22 | End: 2022-10-06 | Stop reason: HOSPADM

## 2022-03-22 RX ORDER — ACETAMINOPHEN 500 MG
1000 TABLET ORAL
Status: COMPLETED | OUTPATIENT
Start: 2022-03-22 | End: 2022-03-22

## 2022-03-22 RX ORDER — OXYCODONE HYDROCHLORIDE 5 MG/1
5 TABLET ORAL
Status: DISCONTINUED | OUTPATIENT
Start: 2022-03-22 | End: 2022-03-22 | Stop reason: HOSPADM

## 2022-03-22 RX ORDER — ALBUMIN HUMAN 50 G/1000ML
25 SOLUTION INTRAVENOUS ONCE
Status: COMPLETED | OUTPATIENT
Start: 2022-03-22 | End: 2022-03-22

## 2022-03-22 RX ORDER — ATROPINE SULFATE 0.1 MG/ML
0.5 INJECTION INTRAVENOUS ONCE
Status: COMPLETED | OUTPATIENT
Start: 2022-03-22 | End: 2022-03-22

## 2022-03-22 RX ORDER — ROPIVACAINE HYDROCHLORIDE 5 MG/ML
INJECTION, SOLUTION EPIDURAL; INFILTRATION; PERINEURAL
Status: COMPLETED | OUTPATIENT
Start: 2022-03-22 | End: 2022-03-22

## 2022-03-22 RX ORDER — FLUMAZENIL 0.1 MG/ML
INJECTION INTRAVENOUS
Status: DISCONTINUED | OUTPATIENT
Start: 2022-03-22 | End: 2022-03-22

## 2022-03-22 RX ORDER — SODIUM CHLORIDE, SODIUM LACTATE, POTASSIUM CHLORIDE, CALCIUM CHLORIDE 600; 310; 30; 20 MG/100ML; MG/100ML; MG/100ML; MG/100ML
INJECTION, SOLUTION INTRAVENOUS CONTINUOUS
Status: DISCONTINUED | OUTPATIENT
Start: 2022-03-22 | End: 2022-03-22 | Stop reason: HOSPADM

## 2022-03-22 RX ORDER — DEXAMETHASONE SODIUM PHOSPHATE 4 MG/ML
INJECTION, SOLUTION INTRA-ARTICULAR; INTRALESIONAL; INTRAMUSCULAR; INTRAVENOUS; SOFT TISSUE
Status: DISCONTINUED | OUTPATIENT
Start: 2022-03-22 | End: 2022-03-22

## 2022-03-22 RX ORDER — MIDAZOLAM HYDROCHLORIDE 1 MG/ML
INJECTION INTRAMUSCULAR; INTRAVENOUS
Status: DISCONTINUED | OUTPATIENT
Start: 2022-03-22 | End: 2022-03-22

## 2022-03-22 RX ORDER — SODIUM CHLORIDE 0.9 % (FLUSH) 0.9 %
3 SYRINGE (ML) INJECTION
Status: DISCONTINUED | OUTPATIENT
Start: 2022-03-22 | End: 2022-03-22 | Stop reason: HOSPADM

## 2022-03-22 RX ORDER — LABETALOL HYDROCHLORIDE 5 MG/ML
INJECTION, SOLUTION INTRAVENOUS
Status: DISCONTINUED | OUTPATIENT
Start: 2022-03-22 | End: 2022-03-22

## 2022-03-22 RX ORDER — EPINEPHRINE 1 MG/ML
INJECTION, SOLUTION INTRACARDIAC; INTRAMUSCULAR; INTRAVENOUS; SUBCUTANEOUS
Status: DISCONTINUED | OUTPATIENT
Start: 2022-03-22 | End: 2022-03-22 | Stop reason: HOSPADM

## 2022-03-22 RX ORDER — DIPHENHYDRAMINE HYDROCHLORIDE 50 MG/ML
25 INJECTION INTRAMUSCULAR; INTRAVENOUS EVERY 6 HOURS PRN
Status: DISCONTINUED | OUTPATIENT
Start: 2022-03-22 | End: 2022-03-22 | Stop reason: HOSPADM

## 2022-03-22 RX ORDER — PROCHLORPERAZINE EDISYLATE 5 MG/ML
5 INJECTION INTRAMUSCULAR; INTRAVENOUS EVERY 30 MIN PRN
Status: DISCONTINUED | OUTPATIENT
Start: 2022-03-22 | End: 2022-03-22 | Stop reason: HOSPADM

## 2022-03-22 RX ORDER — LIDOCAINE HYDROCHLORIDE 20 MG/ML
INJECTION INTRAVENOUS
Status: DISCONTINUED | OUTPATIENT
Start: 2022-03-22 | End: 2022-03-22

## 2022-03-22 RX ORDER — HYDROCODONE BITARTRATE AND ACETAMINOPHEN 10; 325 MG/1; MG/1
1 TABLET ORAL
Qty: 40 TABLET | Refills: 0 | Status: ON HOLD | OUTPATIENT
Start: 2022-03-22 | End: 2022-10-06 | Stop reason: HOSPADM

## 2022-03-22 RX ORDER — MEPERIDINE HYDROCHLORIDE 25 MG/ML
12.5 INJECTION INTRAMUSCULAR; INTRAVENOUS; SUBCUTANEOUS ONCE AS NEEDED
Status: DISCONTINUED | OUTPATIENT
Start: 2022-03-22 | End: 2022-03-22 | Stop reason: HOSPADM

## 2022-03-22 RX ORDER — ONDANSETRON 4 MG/1
8 TABLET, ORALLY DISINTEGRATING ORAL EVERY 8 HOURS PRN
Qty: 25 TABLET | Refills: 1 | Status: SHIPPED | OUTPATIENT
Start: 2022-03-22

## 2022-03-22 RX ORDER — CLINDAMYCIN PHOSPHATE 900 MG/50ML
900 INJECTION, SOLUTION INTRAVENOUS
Status: COMPLETED | OUTPATIENT
Start: 2022-03-22 | End: 2022-03-22

## 2022-03-22 RX ORDER — ROCURONIUM BROMIDE 10 MG/ML
INJECTION, SOLUTION INTRAVENOUS
Status: DISCONTINUED | OUTPATIENT
Start: 2022-03-22 | End: 2022-03-22

## 2022-03-22 RX ORDER — ONDANSETRON 2 MG/ML
INJECTION INTRAMUSCULAR; INTRAVENOUS
Status: DISCONTINUED | OUTPATIENT
Start: 2022-03-22 | End: 2022-03-22

## 2022-03-22 RX ORDER — LIDOCAINE HYDROCHLORIDE 10 MG/ML
0.5 INJECTION, SOLUTION EPIDURAL; INFILTRATION; INTRACAUDAL; PERINEURAL ONCE
Status: DISCONTINUED | OUTPATIENT
Start: 2022-03-22 | End: 2022-03-22 | Stop reason: HOSPADM

## 2022-03-22 RX ORDER — PROPOFOL 10 MG/ML
VIAL (ML) INTRAVENOUS
Status: DISCONTINUED | OUTPATIENT
Start: 2022-03-22 | End: 2022-03-22

## 2022-03-22 RX ORDER — HYDROMORPHONE HYDROCHLORIDE 2 MG/ML
0.4 INJECTION, SOLUTION INTRAMUSCULAR; INTRAVENOUS; SUBCUTANEOUS EVERY 5 MIN PRN
Status: DISCONTINUED | OUTPATIENT
Start: 2022-03-22 | End: 2022-03-22 | Stop reason: HOSPADM

## 2022-03-22 RX ADMIN — DEXAMETHASONE SODIUM PHOSPHATE 8 MG: 4 INJECTION, SOLUTION INTRAMUSCULAR; INTRAVENOUS at 10:03

## 2022-03-22 RX ADMIN — ONDANSETRON 4 MG: 2 INJECTION INTRAMUSCULAR; INTRAVENOUS at 12:03

## 2022-03-22 RX ADMIN — PROPOFOL 150 MG: 10 INJECTION, EMULSION INTRAVENOUS at 10:03

## 2022-03-22 RX ADMIN — CLINDAMYCIN PHOSPHATE 900 MG: 18 INJECTION, SOLUTION INTRAVENOUS at 10:03

## 2022-03-22 RX ADMIN — LIDOCAINE HYDROCHLORIDE 100 MG: 20 INJECTION, SOLUTION INTRAVENOUS at 10:03

## 2022-03-22 RX ADMIN — GLYCOPYRROLATE 0.2 MG: 0.2 INJECTION, SOLUTION INTRAMUSCULAR; INTRAVITREAL at 10:03

## 2022-03-22 RX ADMIN — ONDANSETRON HYDROCHLORIDE 4 MG: 2 INJECTION INTRAMUSCULAR; INTRAVENOUS at 10:03

## 2022-03-22 RX ADMIN — ALBUMIN (HUMAN) 25 G: 12.5 SOLUTION INTRAVENOUS at 11:03

## 2022-03-22 RX ADMIN — MIDAZOLAM HYDROCHLORIDE 2 MG: 1 INJECTION, SOLUTION INTRAMUSCULAR; INTRAVENOUS at 08:03

## 2022-03-22 RX ADMIN — ATROPINE SULFATE 0.5 MG: 0.1 INJECTION INTRAVENOUS at 11:03

## 2022-03-22 RX ADMIN — ROPIVACAINE HYDROCHLORIDE 15 ML: 5 INJECTION, SOLUTION EPIDURAL; INFILTRATION; PERINEURAL at 08:03

## 2022-03-22 RX ADMIN — ROCURONIUM BROMIDE 50 MG: 10 INJECTION, SOLUTION INTRAVENOUS at 10:03

## 2022-03-22 RX ADMIN — LABETALOL HYDROCHLORIDE 15 MG: 5 INJECTION INTRAVENOUS at 11:03

## 2022-03-22 RX ADMIN — PROPOFOL 50 MG: 10 INJECTION, EMULSION INTRAVENOUS at 11:03

## 2022-03-22 RX ADMIN — FLUMAZENIL 0.5 MG: 0.1 INJECTION, SOLUTION INTRAVENOUS at 11:03

## 2022-03-22 RX ADMIN — FENTANYL CITRATE 100 MCG: 50 INJECTION, SOLUTION INTRAMUSCULAR; INTRAVENOUS at 08:03

## 2022-03-22 RX ADMIN — SODIUM CHLORIDE, SODIUM LACTATE, POTASSIUM CHLORIDE, AND CALCIUM CHLORIDE: 600; 310; 30; 20 INJECTION, SOLUTION INTRAVENOUS at 09:03

## 2022-03-22 RX ADMIN — SUGAMMADEX 200 MG: 100 INJECTION, SOLUTION INTRAVENOUS at 11:03

## 2022-03-22 RX ADMIN — FENTANYL CITRATE 100 MCG: 50 INJECTION, SOLUTION INTRAMUSCULAR; INTRAVENOUS at 11:03

## 2022-03-22 RX ADMIN — ACETAMINOPHEN 1000 MG: 500 TABLET, FILM COATED ORAL at 07:03

## 2022-03-22 NOTE — OP NOTE
[facility]  Surgery Department  Operative Note    SUMMARY     Date of Procedure: 3/22/2022     Procedure:   1. Right shoulder arthroscopic rotator cuff repair  2. Right shoulder arthroscopic biceps tenotomy  3. Right shoulder arthroscopic subacromial decompression (acromioplasty, CA ligament recession, bursectomy, lateral acromial bevelling)    Surgeon(s) and Role:     * Hernán Apodaca MD - Primary    Assistant: Syd SIMPSON    Pre-Operative Diagnosis:   1. Right shoulder rotator cuff tear  2. Right shoulder long head biceps tear  3. Right shoulder impingement syndrome    Post-Operative Diagnosis:   Same    Anesthesia: General + regional    Technical Procedures Used: Ms. Downing was taken to the operating room 3/2 2/2 2 for planned right shoulder arthroscopy.  She was given 900 mg of clindamycin as well as a regional nerve block by the Anesthesia Service preoperatively.  She was brought to the operating room and placed in the supine position.  General tracheal anesthesia was administered.  Examination under anesthesia revealed full passive motion.  She was then placed in the lateral decubitus position with all bony prominences padded appropriately with an axillary roll.  A shoulder suspension device was used.  The right shoulder was prepped and draped in the usual sterile fashion and a time-out procedure was performed.  30 cc of normal saline were then injected into the glenohumeral joint aid in capsular distention.  A standard posterior portal was established.    Diagnostic arthroscopy then proceeded.  There was high-grade partial-thickness tearing and thickening of the long head biceps within the joint.  The subscapularis was unremarkable.  There is no significant chondral injury to the humeral head or glenoid.  There was degenerative labral fraying superiorly anteriorly and posteriorly.  There was a high-grade partial versus full-thickness tear of the supraspinatus at the  insertion.    An ablator device was used to perform a biceps tenotomy at the long head biceps origin.  The tendon was quite thickened so it was not seen to retract so no formal tenodesis was performed.  The shaver was used to trim up the undersurface fraying of the supraspinatus and infraspinatus insertion.  A focal full-thickness tear at the supraspinatus insertion was confirmed.  An 18 gauge spinal needle was then placed percutaneously and a PDS suture was shuttled for later inspection from the bursal side.  The scope was then placed into the subacromial space.      Thickened chronic appearing bursal tissue was encountered.  Through a lateral portal a thorough bursectomy was performed.  The full-thickness tear was confirmed.  The shaver was used to trim some of the nonviable edges and to a simple non retracted crescent type tear.  Overall tissue quality was fairly good.  The shaver was used to remove any debris or soft tissue off the insertion site.  Through an accessory lateral portal an Arthrex 2.6 mm triple loaded FiberTak anchor was placed at the articular margin.  All 6 sutures were passed in a horizontal mattress fashion.  Inspection from both the lateral and posterior side showed that some anterior tissue was not captured so I placed 2 FiberLink sutures were with the scorpion suture passing device anteriorly to capture this tissue.  Sliding knots were then tied for the 6 remaining sutures.  At a double row construct was then performed.  One suture from each knot of the 2 anterior sutures were placed in the posterior lateral SwiveLock and the 3 remaining sutures were placed in the anterolateral SwiveLock.  No dog ears were seen in the tissue lay down nicely.  Please note that prior to knot tying several microfracture holes were placed with a PIC for marrow elements and biologic healing.    Next a subacromial decompression was performed.  The CA ligament was recessed off the anterior/lateral acromion and a  bur was used to perform an acromioplasty from a type 2 to a type 1 morphology.  Any remaining soft tissue or debris were then removed with a shaver.  Final arthroscopic photos were then taken.  The portals were closed with 3-0 Prolene suture in a soft dressing was applied followed by a polar Care unit.  The patient was then extubated in the operating room and taken to the PACU without complication.    Description of the Findings of the Procedure:  See dictation    Significant Surgical Tasks Conducted by the Assistant(s), if Applicable:  Positioning, prepping, draping, camera, instrumentation, closure, bandage, brace    Complications: No    Estimated Blood Loss (EBL): 3cc           Implants:   Implant Name Type Inv. Item Serial No.  Lot No. LRB No. Used Action   ANCHOR FIBERTAK SOFT 2.6 - [id number]  ANCHOR FIBERTAK SOFT 2.6  [employer] [id number] Right 1 Implanted   ANCHOR BIOCOMP SWVLLOK - [id number]  ANCHOR BIOCOMP SWVLLOK  [employer] [id number] Right 1 Implanted   ANCHOR BIOCOMP SWVLLOK - [id number]  ANCHOR BIOCOMP SWVLLOK  [employer] [id number] Right 1 Implanted       Specimens:   Specimen (24h ago, onward)            None                  Condition: Good    Disposition: PACU - hemodynamically stable.    Attestation: I was present and scrubbed for the entire procedure.    Discharge Note    SUMMARY     Admit Date: 3/22/2022    Discharge Date and Time:  03/22/2022 11:41 AM    Hospital Course (synopsis of major diagnoses, care, treatment, and services provided during the course of the hospital stay): outpt     Final Diagnosis: Post-Op Diagnosis Codes:     * Incomplete rotator cuff tear or rupture of right shoulder, not specified as traumatic [M75.111]     * Superior glenoid labrum lesion of right shoulder, initial encounter [S43.431A]     * Impingement syndrome of right shoulder [M75.41]    Disposition: Home or Self Care    Follow Up/Patient Instructions:     Medications:  Reconciled Home Medications:      Medication  List      START taking these medications    HYDROcodone-acetaminophen  mg per tablet  Commonly known as: NORCO  Take 1 tablet by mouth every 4 to 6 hours as needed for Pain.     minocycline 100 MG capsule  Commonly known as: MINOCIN,DYNACIN  Take 1 capsule (100 mg total) by mouth every 12 (twelve) hours.     ondansetron 4 MG Tbdl  Commonly known as: ZOFRAN-ODT  dissolve 2 tablets (8 mg total) by mouth every 8 (eight) hours as needed (nausea).        CONTINUE taking these medications    AJOVY SYRINGE SUBQ  Inject into the skin every 30 days.     buPROPion 300 MG 24 hr tablet  Commonly known as: WELLBUTRIN XL  Take 300 mg by mouth once daily.     butalbital-acetaminophen-caff -40 mg Cap     carBAMazepine 200 mg tablet  Commonly known as: TEGRETOL  Take 200 mg by mouth 3 (three) times daily.     clonazePAM 0.5 MG tablet  Commonly known as: KlonoPIN  Take 0.5 mg by mouth once daily.     gabapentin 300 MG capsule  Commonly known as: NEURONTIN  gabapentin 300 mg capsule     ketorolac 10 mg tablet  Commonly known as: TORADOL     ondansetron 8 MG tablet  Commonly known as: ZOFRAN  TAKE 1 TABLET BY MOUTH TWICE A DAY AS DIRECTED WITH EACH DOSE OF LEVODOPA     oxyCODONE 10 mg Tab immediate release tablet  Commonly known as: ROXICODONE  Take 10 mg by mouth every 4 to 6 hours as needed.     pantoprazole 40 MG tablet  Commonly known as: PROTONIX  Take 40 mg by mouth once daily.     prochlorperazine 10 MG tablet  Commonly known as: COMPAZINE  Take 10 mg by mouth 3 (three) times daily.     propranoloL 20 MG tablet  Commonly known as: INDERAL  Take 20 mg by mouth 2 (two) times daily.     simvastatin 20 MG tablet  Commonly known as: ZOCOR  Take 20 mg by mouth every evening.     tiZANidine 4 MG tablet  Commonly known as: ZANAFLEX  Take 4 mg by mouth every 6 to 8 hours as needed.     traMADoL 50 mg tablet  Commonly known as: ULTRAM  Take 50 mg by mouth once daily.     traZODone 50 MG tablet  Commonly known as: DESYREL  Take  50 mg by mouth every evening.     VENTOLIN HFA 90 mcg/actuation inhaler  Generic drug: albuterol     zonisamide 100 MG Cap  Commonly known as: ZONEGRAN  Take 200 mg by mouth.          Discharge Procedure Orders   COVID-19 Routine Screening   Standing Status: Future Standing Exp. Date: 04/26/23     Order Specific Question Answer Comments   Is the patient symptomatic? No    Is this needed for pre-procedure or pre-op testing? Yes    Diagnosis: Pre-op testing [507881]      Diet general     Call MD for:  temperature >100.4     Call MD for:  persistent nausea and vomiting     Call MD for:  severe uncontrolled pain     Call MD for:  difficulty breathing, headache or visual disturbances     Call MD for:  redness, tenderness, or signs of infection (pain, swelling, redness, odor or green/yellow discharge around incision site)     Call MD for:  hives     Call MD for:  persistent dizziness or light-headedness     Call MD for:  extreme fatigue     Keep surgical extremity elevated     Ice to affected area     Lifting restrictions     Remove dressing in 72 hours   Order Comments: Then change daily.  Keep clean, dry and covered      Follow-up Information     Hernán Muñoz MD. Schedule an appointment as soon as possible for a visit in 10 day(s).    Specialty: Orthopedic Surgery  Why: For suture removal, For wound re-check  Contact information:  [facility] [facility] [address] [phone]

## 2022-03-22 NOTE — ANESTHESIA POSTPROCEDURE EVALUATION
Anesthesia Post Evaluation    Patient: Kecia Downing    Procedure(s) Performed: Procedure(s) (LRB):  ARTHROSCOPY, SHOULDER (Right)  REPAIR, ROTATOR CUFF (Right)  FIXATION, TENDON - BICEPS TENOTOMY (Right)  ARTHROSCOPY, SHOULDER, WITH SUBACROMIAL SPACE DECOMPRESSION (Right)    Final Anesthesia Type: general      Patient location during evaluation: PACU  Patient participation: Yes- Able to Participate  Level of consciousness: awake and alert  Post-procedure vital signs: reviewed and stable  Pain management: adequate  Airway patency: patent    PONV status at discharge: No PONV  Anesthetic complications: no      Cardiovascular status: blood pressure returned to baseline  Respiratory status: unassisted, spontaneous ventilation and room air  Hydration status: euvolemic  Follow-up not needed.          Vitals Value Taken Time   /51 03/22/22 1400   Temp 36.6 °C (97.8 °F) 03/22/22 1300   Pulse 81 03/22/22 1400   Resp 16 03/22/22 1400   SpO2 95 % 03/22/22 1400         Event Time   Out of Recovery 12:59:52         Pain/Allen Score: Pain Rating Prior to Med Admin: 5 (3/22/2022  7:58 AM)  Allen Score: 10 (3/22/2022  2:00 PM)

## 2022-03-22 NOTE — TRANSFER OF CARE
Anesthesia Transfer of Care Note    Patient: Kecia Downing    Procedure(s) Performed: Procedure(s) (LRB):  ARTHROSCOPY, SHOULDER (Right)  REPAIR, ROTATOR CUFF (Right)  FIXATION, TENDON - BICEPS TENOTOMY (Right)  ARTHROSCOPY, SHOULDER, WITH SUBACROMIAL SPACE DECOMPRESSION (Right)    Patient location: PACU    Anesthesia Type: general    Transport from OR: Transported from OR on 6-10 L/min O2 by face mask with adequate spontaneous ventilation    Post pain: adequate analgesia    Post assessment: no apparent anesthetic complications    Post vital signs: stable    Level of consciousness: awake (drowsy)    Nausea/Vomiting: no nausea/vomiting    Complications: none    Transfer of care protocol was followed      Last vitals:   Visit Vitals  BP (!) 141/89 (BP Location: Left arm, Patient Position: Sitting)   Pulse 64   Temp 36.2 °C (97.1 °F) (Axillary)   Resp 16   Wt 97.5 kg (215 lb)   SpO2 99%   Breastfeeding No   BMI 33.67 kg/m²

## 2022-03-22 NOTE — ANESTHESIA PROCEDURE NOTES
Right sup trunk    Patient location during procedure: holding area   Block not for primary anesthetic.  Reason for block: at surgeon's request and post-op pain management   Post-op Pain Location: Right shoulder   Timeout: 3/22/2022 8:48 AM   End time: 3/22/2022 8:55 AM    Staffing  Authorizing Provider: Sushil Avery MD  Performing Provider: Sushil Avery MD    Preanesthetic Checklist  Completed: patient identified, IV checked, site marked, risks and benefits discussed, surgical consent, monitors and equipment checked, pre-op evaluation and timeout performed  Peripheral Block  Patient position: left lateral decubitus  Prep: ChloraPrep  Patient monitoring: heart rate, cardiac monitor, continuous pulse ox and frequent blood pressure checks  Block type: Sup Trunk  Laterality: right  Injection technique: single shot  Needle  Needle gauge: 21 G  Needle length: 4 in  Needle localization: ultrasound guidance and anatomical landmarks   -ultrasound image captured on disc.  Assessment  Injection assessment: negative aspiration and negative parasthesia  Paresthesia pain: none  Heart rate change: no  Slow fractionated injection: yes  Pain Tolerance: comfortable throughout block and no complaints  Medications:    Medications: ropivacaine (NAROPIN) injection 0.5% - Perineural   15 mL - 3/22/2022 8:55:00 AM

## 2022-03-22 NOTE — PLAN OF CARE
Kecia Butler [place] has met all discharge criteria from Phase II. Vital Signs are stable, ambulating  without difficulty.Pain is now under control and tolerable for the pt. Pain score 3 at this time.  Discharge instructions given, patient verbalized understanding. Discharged from facility via wheelchair in stable condition.

## 2022-03-22 NOTE — ANESTHESIA PROCEDURE NOTES
Intubation    Date/Time: 3/22/2022 10:09 AM  Performed by: Lara Segovia CRNA  Authorized by: Tono Martínez MD     Intubation:     Induction:  Inhalational - ETT/trach    Intubated:  Postinduction    Mask Ventilation:  Easy with oral airway    Attempts:  1    Attempted By:  CRNA    Method of Intubation:  Video laryngoscopy    Blade:  Bustillos 3    Laryngeal View Grade: Grade I - full view of cords      Difficult Airway Encountered?: No      Complications:  None    Airway Device:  Oral endotracheal tube    Airway Device Size:  7.0    Style/Cuff Inflation:  Cuffed (inflated to minimal occlusive pressure)    Secured at:  The lips    Placement Verified By:  Capnometry    Complicating Factors:  None    Findings Post-Intubation:  BS equal bilateral and atraumatic/condition of teeth unchanged

## 2022-03-22 NOTE — DISCHARGE INSTRUCTIONS
Anesthesia: After Your Surgery  Youve just had surgery. During surgery, you received medication called anesthesia to keep you comfortable and pain-free. After surgery, you may experience some pain or nausea. This is common. Here are some tips for feeling better and recovering after surgery.    Going home  Your doctor or nurse will show you how to take care of yourself when you go home. He or she will also answer your questions. Have an adult family member or friend drive you home. For the first 24 hours after your surgery:  Do not drive or use heavy equipment.  Do not make important decisions or sign legal documents.  Avoid alcohol.  Have someone stay with you, if needed. He or she can watch for problems and help keep you safe.  Take your time getting up from a seated or lying position. You may experience dizziness for 24 hours  Be sure to keep all follow-up appointments with your doctor. And rest after your procedure for as long as your doctor tells you to.    Coping with pain  If you have pain after surgery, pain medication will help you feel better. Take it as directed, before pain becomes severe. Also, ask your doctor or pharmacist about other ways to control pain, such as with heat, ice, and relaxation. And follow any other instructions your surgeon or nurse gives you.    URINARY RETENTION  Should you experience a decrease in your urine output or are unable to urinate following surgery, this can be due to the medications given during surgery.  We recommend you going to the nearest Emergency Department.    Tips for taking pain medication  To get the best relief possible, remember these points:  Pain medications can upset your stomach. Taking them with a little food may help.  Most pain relievers taken by mouth need at least 20 to 30 minutes to take effect.  Taking medication on a schedule can help you remember to take it. Try to time your medication so that you can take it before beginning an activity, such  as dressing, walking, or sitting down for dinner.  Constipation is a common side effect of pain medications. Contact your doctor before taking any medications like laxatives or stool softeners to help relieve constipation. Also ask about any dietary restrictions, because drinking lots of fluids and eating foods like fruits and vegetables that are high in fiber can also help. Remember, dont take laxatives unless your surgeon has prescribed them.  Mixing alcohol and pain medication can cause dizziness and slow your breathing. It can even be fatal. Dont drink alcohol while taking pain medication.  Pain medication can slow your reflexes. Dont drive or operate machinery while taking pain medication.  If your health care provider tells you to take acetaminophen to help relieve your pain, ask him or her how much you are supposed to take each day. (Acetaminophen is the generic name for Tylenol and other brand-name pain relievers.) Acetaminophen or other pain relievers may interact with your prescription medicines or other over-the-counter (OTC) drugs. Some prescription medications contain acetaminophen along with other active ingredients. Using both prescription and OTC acetaminophen for pain can cause you to overdose. The FDA recommends that you read the labels on your OTC medications carefully. This will help you to clearly understand the list of active ingredients, dosing instructions, and any warnings. It may also help you avoid taking too much acetaminophen. If you have questions or don't understand the information, ask your pharmacist or health care provider to explain it to you before you take the OTC medication.    Managing nausea  Some people have an upset stomach after surgery. This is often due to anesthesia, pain, pain medications, or the stress of surgery. The following tips will help you manage nausea and get good nutrition as you recover. If you were on a special diet before surgery, ask your doctor if you  should follow it during recovery. These tips may help:  Dont push yourself to eat. Your body will tell you when to eat and how much.  Start off with clear liquids and soup. They are easier to digest.  Progress to semi-solid foods (mashed potatoes, applesauce, and gelatin) as you feel ready.  Slowly move to solid foods. Dont eat fatty, rich, or spicy foods at first.  Dont force yourself to have three large meals a day. Instead, eat smaller amounts more often.  Take pain medications with a small amount of solid food, such as crackers or toast to avoid nausea.      Call your surgeon if    You feel too sleepy, dizzy, or groggy (medication may be too strong).  You have side effects like nausea, vomiting, or skin changes (rash, itching, or hives).   © 8383-8278 [employer]. [address] [place] [address]. All rights reserved. This information is not intended as a substitute for professional medical care. Always follow your healthcare professional's instructions.                        Postoperative Instructions for Shoulder Surgery               Your Surgery Included:   Open              Arthroscopic [] Instability Repair     [] Diagnostic   [] Rotator Cuff Repair     [] Lysis of Adhesions / Manipulation [] Distal Clavicle Resection    [] Debridement [] Biceps Tenodesis       [] Labrum  [] Rotator Cuff   [] Cartilage   [] Contracture Release    [] SLAP Repair   [] Fracture Fixation    [] Instability Repair  [] AC Joint Reconstruction      [] Rotator Cuff Repair [] Joint Replacement     [] Subacromial Decompression / Bursectomy   [] Hemiarthroplasty  [] Total Shoulder    [] Biceps Tenotomy / Tenodesis    [] Reverse Total Shoulder       [] Distal Clavicle Resection          [] Amniox Arthrocentesis    [] Contracture Release                 Call the doctor immediately if you experience any of the following:      Excessive bleeding or pus like drainage at the incision site      Uncontrollable pain  not relieved by pain medication      Excessive swelling or redness at the incision site      Fever above 101.5 degrees not controlled with Tylenol or Motrin      Shortness of Breath      Any foul odor or blistering from the surgery site   FOR EMERGENCIES: If any unusual problems or difficulties occur, call our office at [phone]   1.   Pain Management: A cold therapy cuff, pain medications, local injections, and in some cases, regional anesthesia injections are used to manage your post-operative pain. The decision to use each of these options is based on their risks and benefits.    Medications: You were given one or more of the following medication prescriptions before leaving the hospital. Have the prescriptions filled at a pharmacy on your way home and follow the instructions on the bottles. If you need a refill, please call your pharmacy.     Narcotic Medication (usually Vicodin ES, Lortab, Percocet or Nucynta): Begin taking the medication before your shoulder starts to hurt. Some patients do not like to take any medication, but if you wait until your pain is severe before taking, you will be very uncomfortable for several hours waiting for the narcotic to work. Always take with food.    Nausea / Vomiting: For this issue, we prescribe Phenergan, use this medication as directed.    Cold Therapy: You may have been sent home with a [facility] cold therapy unit and wrap for your shoulder. Fill with ice and water to the indicated fill line and use throughout the day for the first two days and then as needed to help relieve pain and control swelling.     Regional Anesthesia Injections (Blocks): You may have been given a regional nerve block either before or after surgery. This may make your entire shoulder numb for 24-36 hours.                    2.   Diet: Eat a bland diet for the first day after surgery. Progress your diet as tolerated. Constipation may occur with Narcotic usage, contact our office if you are  experiencing constipation.   3.   Activity: After you arrive at home, spend most of the first 24 hours resting in bed, on the couch, or in a reclining chair. After the first 24 hours at home, slowly increase your activity level based on your symptoms.   4.   Dressing Change: Remove the dressing on the 3rd day. It is normal for some blood to be seen on the dressings. It is also normal for you to see apparent bruising on the skin around your shoulder when you remove the dressing. If present, leave the steri-strip tape across the incisions. If you are concerned by the drainage or the appearance of your shoulder, please call one of the numbers listed below.   5.   Showering: You may shower on the 3rd day after surgery with waterproof bandages over small incisions. If you have an incision with Prineo (clear mesh), it does not need to be covered. Do not submerge in any water until after your postoperative appointment in clinic.   6.   Shoulder Sling (with/without Pillow attachment): You may have been sent home with a sling / pillow attachment holding your arm away from your body. You may remove the sling when changing clothes or bathing. Make sure to wear the sling while sleeping unless instructed otherwise. You may remove at rest or for exercises.           [] You need to wear the sling {WITH-WITHOUT:04190} pillow for 24 hours a day for {NUMBERS - 1-30:93012} {DAYS, WEEKS ,MONTHS:43074}.   7.  Shoulder Exercises: Begin these exercises the first day after surgery in order to help you regain your motion and strength. You may do the following marked exercises for 2-5 mins five times a day:   [] Shoulder shrugs - Shrug your shoulders up and down.   [] Pendulums - Bend forward allowing your arm to hang down in front of you. Gently swing your arm side-to-side and front to back.                                                                                                                               [] Passive abduction -  Have a family member gently lift your arm away from your body bringing your elbow up to the level of your shoulder.                                                                                                                   [] Shoulder rotation - With your arm at your side, have a family member gently rotate your arm internally and externally.                                                                                                                  [] Scapular retractions - (Squeeze shoulder blades together): Squeeze shoulder blades together while slightly pulling them down (do not shrug your shoulders upward); You can perform 10-15 reps, several times throughout the day, when seated at your desk, driving in the car, etc.                                                                                                                     [] Pulley exercises - Put a towel or long sleeve shirt over the top of a door. Stand facing the door. Use your good arm to gently pull your operative arm up in front of you.   [] Elbow motion - Straighten and bend your elbow.                                                                                                               [] Ball squeezes - use ball attached to sling/pillow or soft (nerf) ball for  strengthening                                                                                                                 8.  Physical Therapy: Physical therapy is an essential component to your recovery from surgery. Your physical therapy will start in {#:56844} {DAYS/WEEKS/MONTHS/YEARS:63333}.   FIRST POSTOPERATIVE VISIT: As scheduled.    P[employer] Instructions

## 2022-09-23 ENCOUNTER — ANESTHESIA EVENT (OUTPATIENT)
Dept: SURGERY | Facility: OTHER | Age: 62
End: 2022-09-23
Payer: MEDICARE

## 2022-10-04 NOTE — PRE-PROCEDURE INSTRUCTIONS
Pre admit phone call completed.    Instructions given to patient about NPO status as follows:     The evening before surgery do not eat anything after 9 p.m. ( this includes hard candy, chewing gum and mints).  You may only have GATORADE, POWERADE AND WATER from 9 p.m. until you leave your home. DO NOT  DRINK ANY LIQUIDS ON THE WAY TO THE HOSPITAL.      Patient was also instructed on the below information:    Park in the Parking lot behind the hospital or in the [employer] [facility] across the street from the parking lot.  Parking is complimentary.  If you will be discharged the same day as your procedure, please arrange for a responsible adult to drive you home or  to accompany you if traveling by taxi.  YOU WILL NOT BE PERMITTED TO DRIVE OR TO LEAVE THE HOSPITAL ALONE AFTER SURGERY.  It is strongly recommended that you arrange for someone to remain with you for the first 24 hrs following your surgery.    Patient verbalized understanding of above instructions.

## 2022-10-06 ENCOUNTER — HOSPITAL ENCOUNTER (OUTPATIENT)
Facility: OTHER | Age: 62
Discharge: HOME | End: 2022-10-06
Attending: ORTHOPAEDIC SURGERY | Admitting: ORTHOPAEDIC SURGERY
Payer: MEDICARE

## 2022-10-06 ENCOUNTER — ANESTHESIA (OUTPATIENT)
Dept: SURGERY | Facility: OTHER | Age: 62
End: 2022-10-06
Payer: MEDICARE

## 2022-10-06 DIAGNOSIS — Z01.818 PREOP TESTING: Primary | ICD-10-CM

## 2022-10-06 DIAGNOSIS — M75.21 BICIPITAL TENDINITIS OF RIGHT SHOULDER: ICD-10-CM

## 2022-10-06 DIAGNOSIS — S46.011A TRAUMATIC COMPLETE TEAR OF RIGHT ROTATOR CUFF, INITIAL ENCOUNTER: ICD-10-CM

## 2022-10-06 LAB
ANION GAP SERPL CALC-SCNC: 8 MMOL/L (ref 8–16)
BASOPHILS # BLD AUTO: 0.06 K/UL (ref 0–0.2)
BASOPHILS NFR BLD: 0.9 % (ref 0–1.9)
BUN SERPL-MCNC: 15 MG/DL (ref 8–23)
CALCIUM SERPL-MCNC: 9.1 MG/DL (ref 8.7–10.5)
CHLORIDE SERPL-SCNC: 107 MMOL/L (ref 95–110)
CO2 SERPL-SCNC: 25 MMOL/L (ref 23–29)
CREAT SERPL-MCNC: 0.6 MG/DL (ref 0.5–1.4)
DIFFERENTIAL METHOD: ABNORMAL
EOSINOPHIL # BLD AUTO: 0.2 K/UL (ref 0–0.5)
EOSINOPHIL NFR BLD: 2.7 % (ref 0–8)
ERYTHROCYTE [DISTWIDTH] IN BLOOD BY AUTOMATED COUNT: 13.4 % (ref 11.5–14.5)
EST. GFR  (NO RACE VARIABLE): >60 ML/MIN/1.73 M^2
GLUCOSE SERPL-MCNC: 117 MG/DL (ref 70–110)
HCT VFR BLD AUTO: 41.9 % (ref 37–48.5)
HGB BLD-MCNC: 13.7 G/DL (ref 12–16)
IMM GRANULOCYTES # BLD AUTO: 0.03 K/UL (ref 0–0.04)
IMM GRANULOCYTES NFR BLD AUTO: 0.5 % (ref 0–0.5)
LYMPHOCYTES # BLD AUTO: 1.4 K/UL (ref 1–4.8)
LYMPHOCYTES NFR BLD: 21.5 % (ref 18–48)
MCH RBC QN AUTO: 30.9 PG (ref 27–31)
MCHC RBC AUTO-ENTMCNC: 32.7 G/DL (ref 32–36)
MCV RBC AUTO: 95 FL (ref 82–98)
MONOCYTES # BLD AUTO: 0.4 K/UL (ref 0.3–1)
MONOCYTES NFR BLD: 6.6 % (ref 4–15)
NEUTROPHILS # BLD AUTO: 4.4 K/UL (ref 1.8–7.7)
NEUTROPHILS NFR BLD: 67.8 % (ref 38–73)
NRBC BLD-RTO: 0 /100 WBC
PLATELET # BLD AUTO: 261 K/UL (ref 150–450)
PMV BLD AUTO: 9.1 FL (ref 9.2–12.9)
POTASSIUM SERPL-SCNC: 3.8 MMOL/L (ref 3.5–5.1)
RBC # BLD AUTO: 4.43 M/UL (ref 4–5.4)
SODIUM SERPL-SCNC: 140 MMOL/L (ref 136–145)
WBC # BLD AUTO: 6.41 K/UL (ref 3.9–12.7)

## 2022-10-06 PROCEDURE — 37000008 HC ANESTHESIA 1ST 15 MINUTES: Performed by: ORTHOPAEDIC SURGERY

## 2022-10-06 PROCEDURE — 27201423 OPTIME MED/SURG SUP & DEVICES STERILE SUPPLY: Performed by: ORTHOPAEDIC SURGERY

## 2022-10-06 PROCEDURE — C1713 ANCHOR/SCREW BN/BN,TIS/BN: HCPCS | Performed by: ORTHOPAEDIC SURGERY

## 2022-10-06 PROCEDURE — 63600175 PHARM REV CODE 636 W HCPCS: Performed by: NURSE ANESTHETIST, CERTIFIED REGISTERED

## 2022-10-06 PROCEDURE — 71000015 HC POSTOP RECOV 1ST HR: Performed by: ORTHOPAEDIC SURGERY

## 2022-10-06 PROCEDURE — 80048 BASIC METABOLIC PNL TOTAL CA: CPT | Performed by: ORTHOPAEDIC SURGERY

## 2022-10-06 PROCEDURE — 36000711: Performed by: ORTHOPAEDIC SURGERY

## 2022-10-06 PROCEDURE — 63600175 PHARM REV CODE 636 W HCPCS: Performed by: ORTHOPAEDIC SURGERY

## 2022-10-06 PROCEDURE — 85025 COMPLETE CBC W/AUTO DIFF WBC: CPT | Performed by: ORTHOPAEDIC SURGERY

## 2022-10-06 PROCEDURE — 63600175 PHARM REV CODE 636 W HCPCS: Performed by: ANESTHESIOLOGY

## 2022-10-06 PROCEDURE — 36415 COLL VENOUS BLD VENIPUNCTURE: CPT | Performed by: ORTHOPAEDIC SURGERY

## 2022-10-06 PROCEDURE — 71000033 HC RECOVERY, INTIAL HOUR: Performed by: ORTHOPAEDIC SURGERY

## 2022-10-06 PROCEDURE — 37000009 HC ANESTHESIA EA ADD 15 MINS: Performed by: ORTHOPAEDIC SURGERY

## 2022-10-06 PROCEDURE — 25000003 PHARM REV CODE 250: Performed by: NURSE ANESTHETIST, CERTIFIED REGISTERED

## 2022-10-06 PROCEDURE — 36000710: Performed by: ORTHOPAEDIC SURGERY

## 2022-10-06 PROCEDURE — 76942 ECHO GUIDE FOR BIOPSY: CPT | Performed by: ANESTHESIOLOGY

## 2022-10-06 PROCEDURE — C1889 IMPLANT/INSERT DEVICE, NOC: HCPCS | Performed by: ORTHOPAEDIC SURGERY

## 2022-10-06 PROCEDURE — 25000003 PHARM REV CODE 250: Performed by: ORTHOPAEDIC SURGERY

## 2022-10-06 PROCEDURE — 71000039 HC RECOVERY, EACH ADD'L HOUR: Performed by: ORTHOPAEDIC SURGERY

## 2022-10-06 PROCEDURE — 71000016 HC POSTOP RECOV ADDL HR: Performed by: ORTHOPAEDIC SURGERY

## 2022-10-06 DEVICE — FIBERTAK RC, TRIPLOAD TAPE BL/W, BLK/W W
Type: IMPLANTABLE DEVICE | Site: SHOULDER | Status: FUNCTIONAL
Brand: ARTHREX®

## 2022-10-06 DEVICE — IMPLANTABLE DEVICE
Type: IMPLANTABLE DEVICE | Site: SHOULDER | Status: FUNCTIONAL
Brand: BIOINDUCTIVE IMPLANT WITH ARTHROSCOPIC DELIVERY SYSTEM - MEDIUM

## 2022-10-06 DEVICE — BIO-COMP SWVLK C, CLD 4.75X19.1MM
Type: IMPLANTABLE DEVICE | Site: SHOULDER | Status: FUNCTIONAL
Brand: ARTHREX®

## 2022-10-06 DEVICE — IMPLANTABLE DEVICE
Type: IMPLANTABLE DEVICE | Site: SHOULDER | Status: FUNCTIONAL
Brand: ROTATION MEDICAL TENDON STAPLES

## 2022-10-06 RX ORDER — LIDOCAINE HCL/PF 100 MG/5ML
SYRINGE (ML) INTRAVENOUS
Status: DISCONTINUED | OUTPATIENT
Start: 2022-10-06 | End: 2022-10-06

## 2022-10-06 RX ORDER — MUPIROCIN 20 MG/G
OINTMENT TOPICAL 2 TIMES DAILY
Status: CANCELLED | OUTPATIENT
Start: 2022-10-06 | End: 2022-10-11

## 2022-10-06 RX ORDER — CEFAZOLIN SODIUM 1 G/3ML
2 INJECTION, POWDER, FOR SOLUTION INTRAMUSCULAR; INTRAVENOUS
Status: COMPLETED | OUTPATIENT
Start: 2022-10-06 | End: 2022-10-06

## 2022-10-06 RX ORDER — OXYCODONE HYDROCHLORIDE 5 MG/1
5 TABLET ORAL
Status: DISCONTINUED | OUTPATIENT
Start: 2022-10-06 | End: 2022-10-06 | Stop reason: HOSPADM

## 2022-10-06 RX ORDER — MIDAZOLAM HYDROCHLORIDE 1 MG/ML
INJECTION INTRAMUSCULAR; INTRAVENOUS
Status: DISCONTINUED | OUTPATIENT
Start: 2022-10-06 | End: 2022-10-06

## 2022-10-06 RX ORDER — PROPOFOL 10 MG/ML
VIAL (ML) INTRAVENOUS
Status: DISCONTINUED | OUTPATIENT
Start: 2022-10-06 | End: 2022-10-06

## 2022-10-06 RX ORDER — MEPERIDINE HYDROCHLORIDE 25 MG/ML
12.5 INJECTION INTRAMUSCULAR; INTRAVENOUS; SUBCUTANEOUS ONCE AS NEEDED
Status: DISCONTINUED | OUTPATIENT
Start: 2022-10-06 | End: 2022-10-06 | Stop reason: HOSPADM

## 2022-10-06 RX ORDER — ROPIVACAINE HYDROCHLORIDE 5 MG/ML
INJECTION, SOLUTION EPIDURAL; INFILTRATION; PERINEURAL
Status: COMPLETED | OUTPATIENT
Start: 2022-10-06 | End: 2022-10-06

## 2022-10-06 RX ORDER — HYDROCODONE BITARTRATE AND ACETAMINOPHEN 10; 325 MG/1; MG/1
1 TABLET ORAL
Qty: 40 TABLET | Refills: 0 | Status: SHIPPED | OUTPATIENT
Start: 2022-10-06

## 2022-10-06 RX ORDER — EPINEPHRINE 1 MG/ML
INJECTION, SOLUTION INTRACARDIAC; INTRAMUSCULAR; INTRAVENOUS; SUBCUTANEOUS
Status: DISCONTINUED | OUTPATIENT
Start: 2022-10-06 | End: 2022-10-06 | Stop reason: HOSPADM

## 2022-10-06 RX ORDER — ROCURONIUM BROMIDE 10 MG/ML
INJECTION, SOLUTION INTRAVENOUS
Status: DISCONTINUED | OUTPATIENT
Start: 2022-10-06 | End: 2022-10-06

## 2022-10-06 RX ORDER — DEXAMETHASONE SODIUM PHOSPHATE 4 MG/ML
INJECTION, SOLUTION INTRA-ARTICULAR; INTRALESIONAL; INTRAMUSCULAR; INTRAVENOUS; SOFT TISSUE
Status: DISCONTINUED | OUTPATIENT
Start: 2022-10-06 | End: 2022-10-06

## 2022-10-06 RX ORDER — ONDANSETRON 4 MG/1
8 TABLET, ORALLY DISINTEGRATING ORAL EVERY 8 HOURS PRN
Qty: 10 TABLET | Refills: 1 | Status: SHIPPED | OUTPATIENT
Start: 2022-10-06

## 2022-10-06 RX ORDER — FENTANYL CITRATE 50 UG/ML
INJECTION, SOLUTION INTRAMUSCULAR; INTRAVENOUS
Status: DISCONTINUED | OUTPATIENT
Start: 2022-10-06 | End: 2022-10-06

## 2022-10-06 RX ORDER — DIPHENHYDRAMINE HYDROCHLORIDE 50 MG/ML
INJECTION INTRAMUSCULAR; INTRAVENOUS
Status: DISCONTINUED | OUTPATIENT
Start: 2022-10-06 | End: 2022-10-06

## 2022-10-06 RX ORDER — MINOCYCLINE HYDROCHLORIDE 100 MG/1
100 CAPSULE ORAL EVERY 12 HOURS
Qty: 28 CAPSULE | Refills: 0 | Status: SHIPPED | OUTPATIENT
Start: 2022-10-06

## 2022-10-06 RX ORDER — ONDANSETRON 2 MG/ML
4 INJECTION INTRAMUSCULAR; INTRAVENOUS DAILY PRN
Status: DISCONTINUED | OUTPATIENT
Start: 2022-10-06 | End: 2022-10-06 | Stop reason: HOSPADM

## 2022-10-06 RX ORDER — HYDROMORPHONE HYDROCHLORIDE 2 MG/ML
0.4 INJECTION, SOLUTION INTRAMUSCULAR; INTRAVENOUS; SUBCUTANEOUS EVERY 5 MIN PRN
Status: DISCONTINUED | OUTPATIENT
Start: 2022-10-06 | End: 2022-10-06 | Stop reason: HOSPADM

## 2022-10-06 RX ORDER — ONDANSETRON 2 MG/ML
INJECTION INTRAMUSCULAR; INTRAVENOUS
Status: DISCONTINUED | OUTPATIENT
Start: 2022-10-06 | End: 2022-10-06

## 2022-10-06 RX ORDER — SODIUM CHLORIDE 0.9 % (FLUSH) 0.9 %
3 SYRINGE (ML) INJECTION
Status: DISCONTINUED | OUTPATIENT
Start: 2022-10-06 | End: 2022-10-06 | Stop reason: HOSPADM

## 2022-10-06 RX ORDER — TRANEXAMIC ACID 100 MG/ML
1000 INJECTION, SOLUTION INTRAVENOUS ONCE
Status: COMPLETED | OUTPATIENT
Start: 2022-10-06 | End: 2022-10-06

## 2022-10-06 RX ADMIN — DEXAMETHASONE SODIUM PHOSPHATE 8 MG: 4 INJECTION, SOLUTION INTRAMUSCULAR; INTRAVENOUS at 07:10

## 2022-10-06 RX ADMIN — ROPIVACAINE HYDROCHLORIDE 20 ML: 5 INJECTION, SOLUTION EPIDURAL; INFILTRATION; PERINEURAL at 06:10

## 2022-10-06 RX ADMIN — CARBOXYMETHYLCELLULOSE SODIUM 2 DROP: 2.5 SOLUTION/ DROPS OPHTHALMIC at 06:10

## 2022-10-06 RX ADMIN — DIPHENHYDRAMINE HYDROCHLORIDE 25 MG: 50 INJECTION, SOLUTION INTRAMUSCULAR; INTRAVENOUS at 07:10

## 2022-10-06 RX ADMIN — LIDOCAINE HYDROCHLORIDE 100 MG: 20 INJECTION, SOLUTION INTRAVENOUS at 06:10

## 2022-10-06 RX ADMIN — ONDANSETRON HYDROCHLORIDE 4 MG: 2 INJECTION INTRAMUSCULAR; INTRAVENOUS at 07:10

## 2022-10-06 RX ADMIN — ROCURONIUM BROMIDE 50 MG: 10 INJECTION, SOLUTION INTRAVENOUS at 06:10

## 2022-10-06 RX ADMIN — TRANEXAMIC ACID 1000 MG: 100 INJECTION, SOLUTION INTRAVENOUS at 07:10

## 2022-10-06 RX ADMIN — GLYCOPYRROLATE 0.2 MG: 0.2 INJECTION, SOLUTION INTRAMUSCULAR; INTRAVITREAL at 06:10

## 2022-10-06 RX ADMIN — GLYCOPYRROLATE 0.2 MG: 0.2 INJECTION, SOLUTION INTRAMUSCULAR; INTRAVITREAL at 07:10

## 2022-10-06 RX ADMIN — SODIUM CHLORIDE, SODIUM LACTATE, POTASSIUM CHLORIDE, AND CALCIUM CHLORIDE: .6; .31; .03; .02 INJECTION, SOLUTION INTRAVENOUS at 06:10

## 2022-10-06 RX ADMIN — MIDAZOLAM HYDROCHLORIDE 1 MG: 1 INJECTION, SOLUTION INTRAMUSCULAR; INTRAVENOUS at 06:10

## 2022-10-06 RX ADMIN — SUGAMMADEX 200 MG: 100 INJECTION, SOLUTION INTRAVENOUS at 08:10

## 2022-10-06 RX ADMIN — PROPOFOL 50 MG: 10 INJECTION, EMULSION INTRAVENOUS at 08:10

## 2022-10-06 RX ADMIN — PROPOFOL 50 MG: 10 INJECTION, EMULSION INTRAVENOUS at 06:10

## 2022-10-06 RX ADMIN — PROPOFOL 200 MG: 10 INJECTION, EMULSION INTRAVENOUS at 06:10

## 2022-10-06 RX ADMIN — FENTANYL CITRATE 100 MCG: 50 INJECTION, SOLUTION INTRAMUSCULAR; INTRAVENOUS at 06:10

## 2022-10-06 RX ADMIN — MIDAZOLAM HYDROCHLORIDE 2 MG: 1 INJECTION, SOLUTION INTRAMUSCULAR; INTRAVENOUS at 06:10

## 2022-10-06 RX ADMIN — SODIUM CHLORIDE, SODIUM LACTATE, POTASSIUM CHLORIDE, AND CALCIUM CHLORIDE: .6; .31; .03; .02 INJECTION, SOLUTION INTRAVENOUS at 08:10

## 2022-10-06 RX ADMIN — CEFAZOLIN 2 G: 330 INJECTION, POWDER, FOR SOLUTION INTRAMUSCULAR; INTRAVENOUS at 07:10

## 2022-10-06 NOTE — TRANSFER OF CARE
"Anesthesia Transfer of Care Note    Patient: Kecia Downing    Procedure(s) Performed: Procedure(s) (LRB):  ARTHROSCOPY, SHOULDER (Right)  REPAIR, ROTATOR CUFF WITH GAITAN AND NEPHEW PATCH GRAFT (Right)  ARTHROSCOPY, SHOULDER, WITH SUBACROMIAL SPACE DECOMPRESSION (Right)  ARTHROSCOPY, SHOULDER, WITH DISTAL CLAVICLE EXCISION (Right)    Patient location: PACU    Anesthesia Type: general    Transport from OR: Transported from OR on 2-3 L/min O2 by NC with adequate spontaneous ventilation    Post pain: adequate analgesia    Post assessment: no apparent anesthetic complications    Post vital signs: stable    Level of consciousness: awake and alert    Nausea/Vomiting: no nausea/vomiting    Complications: none    Transfer of care protocol was followed      Last vitals:   Visit Vitals  /77 (BP Location: Right arm, Patient Position: Sitting)   Pulse 84   Temp 36.7 °C (98 °F) (Oral)   Resp 16   Ht 5' 7" (1.702 m)   Wt 95.3 kg (210 lb)   SpO2 96%   Breastfeeding No   BMI 32.89 kg/m²     "

## 2022-10-06 NOTE — OR NURSING
Pt states pain tolerable. Falls off to sleep. No change from previous assessment. Prepared for transfer to acu.

## 2022-10-06 NOTE — ANESTHESIA PROCEDURE NOTES
Intubation    Date/Time: 10/6/2022 6:55 AM  Performed by: Fior Grande CRNA  Authorized by: Royce El MD     Intubation:     Induction:  Intravenous    Intubated:  Postinduction    Mask Ventilation:  Easy mask    Attempts:  1    Attempted By:  CRNA    Method of Intubation:  Video laryngoscopy    Laryngeal View Grade: Grade I - full view of cords      Difficult Airway Encountered?: No      Complications:  None    Airway Device:  Oral endotracheal tube    Airway Device Size:  7.0    Style/Cuff Inflation:  Cuffed    Tube secured:  21    Secured at:  The lips    Placement Verified By:  Capnometry    Complicating Factors:  None    Findings Post-Intubation:  BS equal bilateral and atraumatic/condition of teeth unchanged

## 2022-10-06 NOTE — ANESTHESIA PROCEDURE NOTES
Peripheral Block    Patient location during procedure: pre-op   Block not for primary anesthetic.  Reason for block: at surgeon's request and post-op pain management   Post-op Pain Location: shoulder pain    End time: 10/6/2022 6:33 AM    Staffing  Authorizing Provider: Tono Martínez MD  Performing Provider: Tono Martínez MD    Preanesthetic Checklist  Completed: patient identified, IV checked, site marked, risks and benefits discussed, surgical consent, monitors and equipment checked, pre-op evaluation and timeout performed  Peripheral Block  Patient position: sitting  Patient monitoring: heart rate, cardiac monitor, continuous pulse ox, continuous capnometry and frequent blood pressure checks  Block type: interscalene  Laterality: right  Injection technique: single shot  Needle  Needle type: Stimuplex   Needle gauge: 22 G  Needle length: 2 in  Needle localization: anatomical landmarks and ultrasound guidance   -ultrasound image captured on disc.  Assessment  Injection assessment: negative aspiration, negative parasthesia and local visualized surrounding nerve  Paresthesia pain: none  Heart rate change: no  Slow fractionated injection: yes  Pain Tolerance: comfortable throughout block and no complaints  Medications:    Medications: ropivacaine (NAROPIN) injection 0.5% - Perineural   20 mL - 10/6/2022 6:23:00 AM    Additional Notes  VSS.  [facility] RN monitoring vitals throughout procedure.  Patient tolerated procedure well.  Phiso hex prep

## 2022-10-06 NOTE — OP NOTE
[facility] ([place])  Surgery Department  Operative Note    SUMMARY     Date of Procedure: 10/6/2022     Procedure:   Right shoulder arthroscopic revision rotator cuff repair  Right shoulder arthroscopic distal clavicle excision (Shraddha procedure)  Application Bioinductive Collagen patch (Smith and Nephew Regeneten - medium)  Right shoulder arthroscopic revision subacromial decompression (acromioplasty, CA ligament recession, bursectomy, lateral acromial bevelling)    Surgeon(s) and Role:     * Hernán Apodaca MD - Primary    Assistant: Syd SIMPSON    Pre-Operative Diagnosis:   Right shoulder rotator cuff tear  Right shoulder acromioclavicular joint osteoarthritis  Right shoulder glenohumeral joint chondromalacia  Right shoulder impingement syndrome    Post-Operative Diagnosis:   Same    Anesthesia: General + regional    Technical Procedures Used: Ms. Downing was brought to the operating room 10/06/2022 for planned right shoulder arthroscopy.  She was given 2 g of Ancef, 1 g of trans anemic acid and a regional nerve block by the Anesthesia Service preoperatively.  She was brought to the operating room and placed in the supine position.  General endotracheal anesthesia was administered.  Examination under anesthesia revealed full passive motion with no pathologic laxity.  Her right shoulder was then prepped and draped in the usual sterile fashion with her arm placed in a shoulder suspension device.  A time-out was performed.  The bony landmarks were marked with a surgical marking pen and 30 cc of normal saline were injected into the glenohumeral joint.  Standard posterior portal was then established.  This was then followed by an anterior portal established through the rotator interval under direct visualization.    Diagnostic arthroscopy then proceeded.  There was mild grade 1/2 chondromalacia noted diffusely of along the glenoid and humeral head with some mild degenerative labral fraying.  The  axilla was unremarkable.  The subscapularis was unremarkable.  A previous biceps tenotomy had been performed.  Sutures from the prior rotator cuff repair were identified and a retear was localized.  It appeared that the tissue had torn off the confluence of the supraspinatus and anterior portion of the infraspinatus.  The sutures that were visible were removed.  The scope was then repositioned into the subacromial space after using a spinal needle and a Prolene suture to hazel the defect.      A thorough bursectomy was performed through the lateral portal.  The retear was identified in the shaver was used to remove any soft tissue off the anatomic insertion of the torn rotator cuff tissue.  All visible prior sutures were removed.  The anterior 1/2 of the supraspinatus had healed well it is just at the posterior half of the anterior portion the infraspinatus is had retorn.  After preparing the insertion site for the repair an accessory lateral portal was established and a 2.6 mm Arthrex triple loaded FiberTak anchor was placed at the articular margin.  Good purchase was achieved.  All 6 sutures were passed in a horizontal mattress fashion approximately 1 cm from the musculotendinous junction.  While passing a viewed from both the posterior and lateral portals.  I placed an accessory FiberLink suture at the anterior most portion of the tear and a capture all the tissue.  A punch was then used to create several channels for biologic elements at the repair site.  Sliding knots were then tied starting posteriorly working anteriorly getting a nice tension-free repair with no dog ears.  A double row construct was then utilized.  The FiberLink anteriorly as well as 1 of each of the sutures from the knots were incorporated into the posterior lateral 4.75 mm SwiveLock.  The 3 remaining sutures were incorporated into the anterior 4.75 mm lateral row SwiveLock.    The shaver was then used to remove any debris.  A prior  acromioplasty had been performed there was a bit of a remaining ridge to the anterior/lateral acromion so a revision acromioplasty was performed to bevel this flat.  There was also an impinging distal clavicle inferior osteophyte which was beveled to the level of the acromioplasty.  The CA ligament was recessed off the acromion.      A distal clavicle excision was then performed.  I was able to adequately view the AC joint with a 30 degree scope in the bur and ablator device were used to remove approximately 1 cm of the distal clavicle keeping the superior capsular ligaments intact.  The shaver was then used to remove any soft tissue or bony debris.      Finally I placed the Smith and Nephew Regeneten patch, size medium.  I delivered the patch through the lateral portal which got complete coverage over some fairly macerated tissue at the confluence of the infraspinatus and supraspinatus.  Tendon only staples were used, I did not have to use any bone staples.  Excellent coverage was achieved.  Finally the shaver and ablator were used to remove any remaining debris or control any bleeding vessels.  Final arthroscopic photos were then taken.      Excess fluid was then removed from the shoulder and the portal sites were closed with 3-0 Prolene suture.  Soft dressing was then applied followed by a polar Care unit and abduction brace.  The patient was then extubated in the operating room and taken to the PACU without complication.    Description of the Findings of the Procedure:  See dictation    Significant Surgical Tasks Conducted by the Assistant(s), if Applicable:  Positioning, prepping, draping, camera, instrumentation, closure, bandage, brace    Complications: No    Estimated Blood Loss (EBL): 3cc           Implants:   Implant Name Type Inv. Item Serial No.  Lot No. LRB No. Used Action   ANCHOR FIBERTAK SOFT 2.6 - [id number]  ANCHOR FIBERTAK SOFT 2.6  [employer] [id number] Right 1 Implanted   ANCHOR TENDON 8  - [id number]  ANCHOR TENDON 8  [employer] [id number] Right 1 Implanted   BIOINDUCTIVE IMPLANT W/ ARTHRO DEL [employer]G MED    [employer] [id number] Right 1 Implanted   BONE ANCHORS (3)    [employer] [id number] Right 1 Implanted   ANCHOR BIOCOMP SWVLLOK - [id number]  ANCHOR BIOCOMP SWVLLOK  [employer] [id number] Right 1 Implanted   ANCHOR BIOCOMP SWVLLOK - [id number]  ANCHOR BIOCOMP SWVLLOK  [employer] [id number] Right 1 Implanted       Specimens:   Specimen (24h ago, onward)      None                    Condition: Good    Disposition: PACU - hemodynamically stable.    Attestation: I was present and scrubbed for the entire procedure.    Discharge Note    SUMMARY     Admit Date: 10/6/2022    Discharge Date and Time:  10/06/2022 8:44 AM    Hospital Course (synopsis of major diagnoses, care, treatment, and services provided during the course of the hospital stay): outpt     Final Diagnosis:   Right shoulder rotator cuff tear    Disposition: Home or Self Care    Follow Up/Patient Instructions:     Medications:  Reconciled Home Medications:      Medication List        CHANGE how you take these medications      * ondansetron 4 MG Tbdl  Commonly known as: ZOFRAN-ODT  dissolve 2 tablets (8 mg total) by mouth every 8 (eight) hours as needed (nausea).  What changed: Another medication with the same name was added. Make sure you understand how and when to take each.     * ondansetron 4 MG Tbdl  Commonly known as: ZOFRAN-ODT  Take 2 tablets (8 mg total) by mouth every 8 (eight) hours as needed (nausea).  What changed: You were already taking a medication with the same name, and this prescription was added. Make sure you understand how and when to take each.           * This list has 2 medication(s) that are the same as other medications prescribed for you. Read the directions carefully, and ask your doctor or other care provider to review them with you.                CONTINUE taking these medications      AJOVCYNDI SYRINGE SUBQ  Inject into the  skin every 30 days.     buPROPion 300 MG 24 hr tablet  Commonly known as: WELLBUTRIN XL  Take 300 mg by mouth once daily.     butalbital-acetaminophen-caff -40 mg Cap  daily as needed.     clonazePAM 0.5 MG tablet  Commonly known as: KlonoPIN  Take 0.5 mg by mouth once daily.     gabapentin 300 MG capsule  Commonly known as: NEURONTIN  gabapentin 300 mg capsule     HYDROcodone-acetaminophen  mg per tablet  Commonly known as: NORCO  Take 1 tablet by mouth every 4 to 6 hours as needed for Pain.     minocycline 100 MG capsule  Commonly known as: MINOCIN,DYNACIN  Take 1 capsule (100 mg total) by mouth every 12 (twelve) hours.     ondansetron 8 MG tablet  Commonly known as: ZOFRAN  TAKE 1 TABLET BY MOUTH TWICE A DAY AS DIRECTED WITH EACH DOSE OF LEVODOPA     pantoprazole 40 MG tablet  Commonly known as: PROTONIX  Take 40 mg by mouth once daily.     prochlorperazine 10 MG tablet  Commonly known as: COMPAZINE  Take 10 mg by mouth 3 (three) times daily.     tiZANidine 4 MG tablet  Commonly known as: ZANAFLEX  Take 4 mg by mouth every 6 to 8 hours as needed.     traZODone 50 MG tablet  Commonly known as: DESYREL  Take 50 mg by mouth every evening.     VENTOLIN HFA 90 mcg/actuation inhaler  Generic drug: albuterol     zonisamide 100 MG Cap  Commonly known as: ZONEGRAN  Take 200 mg by mouth once daily.            STOP taking these medications      ketorolac 10 mg tablet  Commonly known as: TORADOL     oxyCODONE 10 mg Tab immediate release tablet  Commonly known as: ROXICODONE     traMADoL 50 mg tablet  Commonly known as: ULTRAM            ASK your doctor about these medications      carBAMazepine 200 mg tablet  Commonly known as: TEGRETOL  Take 200 mg by mouth 3 (three) times daily.     propranoloL 20 MG tablet  Commonly known as: INDERAL  Take 20 mg by mouth 2 (two) times daily.     simvastatin 20 MG tablet  Commonly known as: ZOCOR  Take 20 mg by mouth every evening.            Discharge Procedure Orders   Diet  general     Call MD for:  temperature >100.4     Call MD for:  persistent nausea and vomiting     Call MD for:  severe uncontrolled pain     Call MD for:  difficulty breathing, headache or visual disturbances     Call MD for:  redness, tenderness, or signs of infection (pain, swelling, redness, odor or green/yellow discharge around incision site)     Call MD for:  hives     Call MD for:  persistent dizziness or light-headedness     Call MD for:  extreme fatigue     Lifting restrictions     Ice to affected area     Keep surgical extremity elevated     Remove dressing in 72 hours   Order Comments: Then change daily.  Keep clean, dry covered     Non weight bearing      Follow-up Information       Hernán Muñoz MD. Schedule an appointment as soon as possible for a visit in 10 day(s).    Specialty: Orthopedic Surgery  Why: For wound re-check, For suture removal  Contact information:  [address] [facility] [address] [phone]

## 2022-10-06 NOTE — PLAN OF CARE
Kecia Downing has met all discharge criteria from Phase II. Vital Signs are stable, ambulating  without difficulty. Discharge instructions given, patient verbalized understanding. Discharged from facility via wheelchair in stable condition.

## 2022-10-06 NOTE — H&P
H&P  has been reviewed.  The patient has been examined, I concur with the findings and no changes have occurred since H&P was written.

## 2022-10-06 NOTE — ANESTHESIA PREPROCEDURE EVALUATION
10/06/2022  Kecia Downing is a 62 y.o., female.      Pre-op Assessment    I have reviewed the Patient Summary Reports.     I have reviewed the Nursing Notes. I have reviewed the NPO Status.   I have reviewed the Medications.     Review of Systems  Anesthesia Hx:  Denies Family Hx of Anesthesia complications.  Personal Hx of Anesthesia complications   Social:  Non-Smoker    Hematology/Oncology:  Hematology Normal   Oncology Normal     Cardiovascular:  Cardiovascular Normal     Pulmonary:   Asthma Sleep Apnea    Renal/:  Renal/ Normal     Hepatic/GI:   GERD    Musculoskeletal:   Arthritis     Neurological:   Neuromuscular Disease, Headaches    Endocrine:  Obesity / BMI > 30      Physical Exam  General: Cooperative, Alert and Oriented    Airway:  Mallampati: II   Mouth Opening: Normal  TM Distance: Normal  Tongue: Normal  Neck ROM: Normal ROM    Dental:  Intact        Anesthesia Plan  Type of Anesthesia, risks & benefits discussed:    Anesthesia Type: Gen ETT  Intra-op Monitoring Plan: Standard ASA Monitors  Post Op Pain Control Plan: multimodal analgesia  Induction:  IV  Airway Plan: Video, Post-Induction  Informed Consent: Informed consent signed with the Patient and all parties understand the risks and agree with anesthesia plan.  All questions answered.   ASA Score: 2    Ready For Surgery From Anesthesia Perspective.     .

## 2022-10-06 NOTE — DISCHARGE INSTRUCTIONS
Anesthesia: After Your Surgery  Youve just had surgery. During surgery, you received medication called anesthesia to keep you comfortable and pain-free. After surgery, you may experience some pain or nausea. This is common. Here are some tips for feeling better and recovering after surgery.    Going home  Your doctor or nurse will show you how to take care of yourself when you go home. He or she will also answer your questions. Have an adult family member or friend drive you home. For the first 24 hours after your surgery:  Do not drive or use heavy equipment.  Do not make important decisions or sign legal documents.  Avoid alcohol.  Have someone stay with you, if needed. He or she can watch for problems and help keep you safe.  Take your time getting up from a seated or lying position. You may experience dizziness for 24 hours  Be sure to keep all follow-up appointments with your doctor. And rest after your procedure for as long as your doctor tells you to.    Coping with pain  If you have pain after surgery, pain medication will help you feel better. Take it as directed, before pain becomes severe. Also, ask your doctor or pharmacist about other ways to control pain, such as with heat, ice, and relaxation. And follow any other instructions your surgeon or nurse gives you.    URINARY RETENTION  Should you experience a decrease in your urine output or are unable to urinate following surgery, this can be due to the medications given during surgery.  We recommend you going to the nearest Emergency Department.    Tips for taking pain medication  To get the best relief possible, remember these points:  Pain medications can upset your stomach. Taking them with a little food may help.  Most pain relievers taken by mouth need at least 20 to 30 minutes to take effect.  Taking medication on a schedule can help you remember to take it. Try to time your medication so that you can take it before beginning an activity, such  as dressing, walking, or sitting down for dinner.  Constipation is a common side effect of pain medications. Contact your doctor before taking any medications like laxatives or stool softeners to help relieve constipation. Also ask about any dietary restrictions, because drinking lots of fluids and eating foods like fruits and vegetables that are high in fiber can also help. Remember, dont take laxatives unless your surgeon has prescribed them.  Mixing alcohol and pain medication can cause dizziness and slow your breathing. It can even be fatal. Dont drink alcohol while taking pain medication.  Pain medication can slow your reflexes. Dont drive or operate machinery while taking pain medication.  If your health care provider tells you to take acetaminophen to help relieve your pain, ask him or her how much you are supposed to take each day. (Acetaminophen is the generic name for Tylenol and other brand-name pain relievers.) Acetaminophen or other pain relievers may interact with your prescription medicines or other over-the-counter (OTC) drugs. Some prescription medications contain acetaminophen along with other active ingredients. Using both prescription and OTC acetaminophen for pain can cause you to overdose. The FDA recommends that you read the labels on your OTC medications carefully. This will help you to clearly understand the list of active ingredients, dosing instructions, and any warnings. It may also help you avoid taking too much acetaminophen. If you have questions or don't understand the information, ask your pharmacist or health care provider to explain it to you before you take the OTC medication.    Managing nausea  Some people have an upset stomach after surgery. This is often due to anesthesia, pain, pain medications, or the stress of surgery. The following tips will help you manage nausea and get good nutrition as you recover. If you were on a special diet before surgery, ask your doctor if you  should follow it during recovery. These tips may help:  Dont push yourself to eat. Your body will tell you when to eat and how much.  Start off with clear liquids and soup. They are easier to digest.  Progress to semi-solid foods (mashed potatoes, applesauce, and gelatin) as you feel ready.  Slowly move to solid foods. Dont eat fatty, rich, or spicy foods at first.  Dont force yourself to have three large meals a day. Instead, eat smaller amounts more often.  Take pain medications with a small amount of solid food, such as crackers or toast to avoid nausea.      Call your surgeon if    You feel too sleepy, dizzy, or groggy (medication may be too strong).  You have side effects like nausea, vomiting, or skin changes (rash, itching, or hives).   © 4405-4457 [employer]. [address] [place] [address]. All rights reserved. This information is not intended as a substitute for professional medical care. Always follow your healthcare professional's instructions.                  Shoulder Arthroscopy Post-Op Instructions                                       Dr. Hernán Apodaca    1. Sling/Brace- You should wear the brace until the first post-op visit. You can take the sling off to shower but keep your arm at your side.  Do not lift your arm away from your body unless told otherwise.  I will give you/your family specific instructions about the length of brace wear.    2. Bandage- If you have physical therapy set up they will remove the bandage. Otherwise you can remove it in 3 days. Keep the incisions clean, dry and covered. Do not get it wet until you see me.     3. Ice- Use the polar care as much as you want. Make sure there are clothes or a towel between the cooling unit and your skin.     4. Exercise- If you have PT set up, they will instruct you on exercises to do. If you do not, it is OK to lean your arm over and make circles with your hand pointing to the floor (called Pendulum  exercises). Do not lift or grasp anything away from the body until you see me. It is OK to take your arm out of the sling and extend your elbow as long as your elbow is at your side.     5. Medications- You will be given pain and nausea prescriptions to go home. Take the medications as written.  Call the office if you are running low with at least 2-3 days notice to give us time to refill it.  We also recommend taking a baby aspirin for 2 weeks after surgery to decrease the (very,very low) risk of blood clot formation.    6. Bathing- Do not get your shoulder wet until you see me in clinic.    7. Appointment- You should already have your post-op appointment scheduled. If you do not or you can't remember, call the office for more information.

## 2022-10-06 NOTE — ANESTHESIA POSTPROCEDURE EVALUATION
Anesthesia Post Evaluation    Patient: Kecia Downing    Procedure(s) Performed: Procedure(s) (LRB):  ARTHROSCOPY, SHOULDER (Right)  REPAIR, ROTATOR CUFF WITH GAITAN AND NEPHEW PATCH GRAFT (Right)  ARTHROSCOPY, SHOULDER, WITH SUBACROMIAL SPACE DECOMPRESSION (Right)  ARTHROSCOPY, SHOULDER, WITH DISTAL CLAVICLE EXCISION (Right)    Final Anesthesia Type: general      Patient location during evaluation: PACU  Patient participation: Yes- Able to Participate  Level of consciousness: awake and alert  Post-procedure vital signs: reviewed and stable  Pain management: adequate  Airway patency: patent  ZEHRA mitigation strategies: Extubation while patient is awake  PONV status at discharge: No PONV  Anesthetic complications: no      Cardiovascular status: hemodynamically stable  Respiratory status: unassisted  Hydration status: euvolemic  Follow-up not needed.          Vitals Value Taken Time   /91 10/06/22 1010   Temp 36.7 °C (98 °F) 10/06/22 0915   Pulse 90 10/06/22 1010   Resp 15 10/06/22 0940   SpO2 96 % 10/06/22 1010         Event Time   Out of Recovery 09:33:16         Pain/Allen Score: Allen Score: 10 (10/6/2022 10:10 AM)

## 2022-10-07 VITALS
BODY MASS INDEX: 32.96 KG/M2 | RESPIRATION RATE: 15 BRPM | TEMPERATURE: 98 F | OXYGEN SATURATION: 96 % | HEART RATE: 90 BPM | WEIGHT: 210 LBS | DIASTOLIC BLOOD PRESSURE: 91 MMHG | HEIGHT: 67 IN | SYSTOLIC BLOOD PRESSURE: 125 MMHG

## 2022-11-15 ENCOUNTER — TELEPHONE (OUTPATIENT)
Dept: SPORTS MEDICINE | Facility: CLINIC | Age: 62
End: 2022-11-15
Payer: MEDICARE

## 2022-11-15 NOTE — TELEPHONE ENCOUNTER
I attempted to call the patient multiple times and the phone seemed to be disconnected. I also called her  and left a voicemail encouraging him to ask her to return the call.

## 2022-11-15 NOTE — TELEPHONE ENCOUNTER
----- Message from Gala Madden sent at 11/14/2022  5:08 PM CST -----  Regarding: FW: PT'S RETURNING A CALL FROM [facility]  Contact: pt    ----- Message -----  From: Jaye Frnacis  Sent: 11/14/2022   1:39 PM CST  To: Jesika Ochoa Staff  Subject: PT'S RETURNING A CALL FROM [facility]                 Pt is scheduled for Wed at 11:30        Confirmed contact info below:  Contact Name: Kecia Downing  Phone Number: [phone]

## 2022-11-16 ENCOUNTER — OFFICE VISIT (OUTPATIENT)
Dept: SPORTS MEDICINE | Facility: CLINIC | Age: 62
End: 2022-11-16
Payer: MEDICARE

## 2022-11-16 ENCOUNTER — HOSPITAL ENCOUNTER (OUTPATIENT)
Dept: RADIOLOGY | Facility: HOSPITAL | Age: 62
Discharge: HOME | End: 2022-11-16
Attending: ORTHOPAEDIC SURGERY
Payer: MEDICARE

## 2022-11-16 VITALS
BODY MASS INDEX: 35 KG/M2 | WEIGHT: 223 LBS | HEART RATE: 57 BPM | HEIGHT: 67 IN | DIASTOLIC BLOOD PRESSURE: 72 MMHG | SYSTOLIC BLOOD PRESSURE: 116 MMHG

## 2022-11-16 DIAGNOSIS — M25.552 LEFT HIP PAIN: ICD-10-CM

## 2022-11-16 DIAGNOSIS — M70.62 GREATER TROCHANTERIC BURSITIS OF LEFT HIP: Primary | ICD-10-CM

## 2022-11-16 PROCEDURE — 73502 X-RAY EXAM HIP UNI 2-3 VIEWS: CPT | Mod: 26,LT,, | Performed by: RADIOLOGY

## 2022-11-16 PROCEDURE — 73502 XR HIP WITH PELVIS WHEN PERFORMED, 2 OR 3 VIEWS LEFT: ICD-10-PCS | Mod: 26,LT,, | Performed by: RADIOLOGY

## 2022-11-16 PROCEDURE — 99204 PR OFFICE/OUTPT VISIT, NEW, LEVL IV, 45-59 MIN: ICD-10-PCS | Mod: S$PBB,,, | Performed by: ORTHOPAEDIC SURGERY

## 2022-11-16 PROCEDURE — 99999 PR PBB SHADOW E&M-EST. PATIENT-LVL V: CPT | Mod: PBBFAC,,, | Performed by: ORTHOPAEDIC SURGERY

## 2022-11-16 PROCEDURE — 73502 X-RAY EXAM HIP UNI 2-3 VIEWS: CPT | Mod: TC,LT

## 2022-11-16 PROCEDURE — 99215 OFFICE O/P EST HI 40 MIN: CPT | Mod: PBBFAC | Performed by: ORTHOPAEDIC SURGERY

## 2022-11-16 PROCEDURE — 99999 PR PBB SHADOW E&M-EST. PATIENT-LVL V: ICD-10-PCS | Mod: PBBFAC,,, | Performed by: ORTHOPAEDIC SURGERY

## 2022-11-16 PROCEDURE — 99204 OFFICE O/P NEW MOD 45 MIN: CPT | Mod: S$PBB,,, | Performed by: ORTHOPAEDIC SURGERY

## 2022-11-16 NOTE — PROGRESS NOTES
CC: left hip pain. Reffered by Radha Auguste (her PT).    HPI:   Kecia Downing is a pleasant 62 y.o. patient who reports to clinic with left hip pain.   Patient had a mechanical fall 1 yr ago at [employer] leading to a direct blow to her adducted R shoulder and felt as if she twisted her L hip to try and prevent her fall.   Was able to ambulate on her LLE after the injury.  Walks with a cane.  [facility]    Today the patient rates pain at a 6/10 on visual analog scale.  Describes as burning sensation.     Affecting ADLs and exercising    L hip pain has gotten worse with walking, getting in and out of a chair, go up and down stairs.    SANE: 60    Has done PT for her hip (last was 5 months ago, provided minimal relief).     Has not tried CSI or Sx.     Of note, patient recently underwent R RTC revision 6 weeks ago with Dr. Apodaca and is doing well from that. She is here primarily for her hip as Dr. Apodaca offered her to undergo a hip scope, but was referred by her PT in order to be examined for discussion of possible hip arthroscopy (due to Dr. Rosado experience in hip arthroscopy) to reattach her glut med/minimus tendons as an MRI she had obtained 8 months prior to this visit that showed a partial thickness tear of those tendons at its insertion over the greater trochanter.     Review of Systems   Constitution: Negative. Negative for chills, fever and night sweats.   HENT: Negative for congestion and headaches.    Eyes: Negative for blurred vision, left vision loss and right vision loss.   Cardiovascular: Negative for chest pain and syncope.   Respiratory: Negative for cough and shortness of breath.    Endocrine: Negative for polydipsia, polyphagia and polyuria.   Hematologic/Lymphatic: Negative for bleeding problem. Does not bruise/bleed easily.   Skin: Negative for dry skin, itching and rash.   Musculoskeletal: Negative for falls and muscle weakness.   Gastrointestinal: Negative  for abdominal pain and bowel incontinence.   Genitourinary: Negative for bladder incontinence and nocturia.   Neurological: Negative for disturbances in coordination, loss of balance and seizures.   Psychiatric/Behavioral: Negative for depression. The patient does not have insomnia.    Allergic/Immunologic: Negative for hives and persistent infections.     PAST MEDICAL HISTORY:   Past Medical History:   Diagnosis Date    Arthritis     Asthma     Migraines 2015    PONV (postoperative nausea and vomiting)     Sleep apnea     does not wear cpap     PAST SURGICAL HISTORY:   Past Surgical History:   Procedure Laterality Date    ANKLE SURGERY      right    ARTHROSCOPY OF SHOULDER WITH DECOMPRESSION OF SUBACROMIAL SPACE Right 3/22/2022    Procedure: ARTHROSCOPY, SHOULDER, WITH SUBACROMIAL SPACE DECOMPRESSION;  Surgeon: Hernán Apodaca MD;  Location: [facility];  Service: Orthopedics;  Laterality: Right;    ARTHROSCOPY OF SHOULDER WITH DECOMPRESSION OF SUBACROMIAL SPACE Right 10/6/2022    Procedure: ARTHROSCOPY, SHOULDER, WITH SUBACROMIAL SPACE DECOMPRESSION;  Surgeon: Hernán Apodaca MD;  Location: [facility];  Service: Orthopedics;  Laterality: Right;    ARTHROSCOPY OF SHOULDER WITH REMOVAL OF DISTAL CLAVICLE Right 10/6/2022    Procedure: ARTHROSCOPY, SHOULDER, WITH DISTAL CLAVICLE EXCISION;  Surgeon: Hernán Apodaca MD;  Location: [facility] OR;  Service: Orthopedics;  Laterality: Right;    CARPAL TUNNEL RELEASE Right 10/9/2020    Procedure: RELEASE, CARPAL TUNNEL;  Surgeon: Claude S. Williams IV, MD;  Location: [facility] OR;  Service: Orthopedics;  Laterality: Right;    FIXATION OF TENDON Right 3/22/2022    Procedure: FIXATION, TENDON - BICEPS TENOTOMY;  Surgeon: Hernán Apodaca MD;  Location: [facility] OR;  Service: Orthopedics;  Laterality: Right;    INTERPOSITION ARTHROPLASTY OF CARPOMETACARPAL JOINTS Right 10/9/2020    Procedure: INTERPOSITION ARTHROPLASTY, CMC JOINT;  Surgeon: Claude S. Williams IV, MD;  Location: [facility] OR;  Service:  Orthopedics;  Laterality: Right;    ROTATOR CUFF REPAIR Right 3/22/2022    Procedure: REPAIR, ROTATOR CUFF;  Surgeon: Hernán Apodaca MD;  Location: [facility] OR;  Service: Orthopedics;  Laterality: Right;    ROTATOR CUFF REPAIR Right 10/6/2022    Procedure: REPAIR, ROTATOR CUFF WITH SMITH AND NEPHEW PATCH GRAFT;  Surgeon: Hernán Apodaca MD;  Location: [facility] OR;  Service: Orthopedics;  Laterality: Right;    SHOULDER ARTHROSCOPY Right 3/22/2022    Procedure: ARTHROSCOPY, SHOULDER;  Surgeon: Hernán Apodaca MD;  Location: [facility] OR;  Service: Orthopedics;  Laterality: Right;  GAITAN AND NEPHEW PATCH    SHOULDER ARTHROSCOPY Right 10/6/2022    Procedure: ARTHROSCOPY, SHOULDER;  Surgeon: Hernán Apodaca MD;  Location: [facility] OR;  Service: Orthopedics;  Laterality: Right;    SHOULDER SURGERY      left    TOTAL KNEE ARTHROPLASTY Bilateral 2007    right Jan. 2007; left Jan 2010     FAMILY HISTORY: History reviewed. No pertinent family history.  SOCIAL HISTORY:   Social History     Socioeconomic History    Marital status:    Tobacco Use    Smoking status: Never    Smokeless tobacco: Never   Substance and Sexual Activity    Alcohol use: No    Drug use: Never       MEDICATIONS:   Current Outpatient Medications:     buPROPion (WELLBUTRIN XL) 300 MG 24 hr tablet, Take 300 mg by mouth once daily., Disp: , Rfl:     butalbital-acetaminophen-caff -40 mg Cap, daily as needed., Disp: , Rfl:     carBAMazepine (TEGRETOL) 200 mg tablet, Take 200 mg by mouth 3 (three) times daily., Disp: , Rfl:     clonazePAM (KLONOPIN) 0.5 MG tablet, Take 0.5 mg by mouth once daily., Disp: , Rfl:     fremanezumab-vfrm (AJOVY SYRINGE SUBQ), Inject into the skin every 30 days., Disp: , Rfl:     gabapentin (NEURONTIN) 300 MG capsule, gabapentin 300 mg capsule, Disp: , Rfl:     HYDROcodone-acetaminophen (NORCO)  mg per tablet, Take 1 tablet by mouth every 4 to 6 hours as needed for Pain., Disp: 40 tablet, Rfl: 0    minocycline (MINOCIN,DYNACIN)  "100 MG capsule, Take 1 capsule (100 mg total) by mouth every 12 (twelve) hours., Disp: 28 capsule, Rfl: 0    ondansetron (ZOFRAN) 8 MG tablet, TAKE 1 TABLET BY MOUTH TWICE A DAY AS DIRECTED WITH EACH DOSE OF LEVODOPA, Disp: , Rfl:     ondansetron (ZOFRAN-ODT) 4 MG TbDL, dissolve 2 tablets (8 mg total) by mouth every 8 (eight) hours as needed (nausea)., Disp: 25 tablet, Rfl: 1    ondansetron (ZOFRAN-ODT) 4 MG TbDL, Take 2 tablets (8 mg total) by mouth every 8 (eight) hours as needed (nausea)., Disp: 10 tablet, Rfl: 1    pantoprazole (PROTONIX) 40 MG tablet, Take 40 mg by mouth once daily., Disp: , Rfl:     prochlorperazine (COMPAZINE) 10 MG tablet, Take 10 mg by mouth 3 (three) times daily., Disp: , Rfl:     propranoloL (INDERAL) 20 MG tablet, Take 20 mg by mouth 2 (two) times daily., Disp: , Rfl:     simvastatin (ZOCOR) 20 MG tablet, Take 20 mg by mouth every evening., Disp: , Rfl:     tiZANidine (ZANAFLEX) 4 MG tablet, Take 4 mg by mouth every 6 to 8 hours as needed., Disp: , Rfl:     trazodone (DESYREL) 50 MG tablet, Take 50 mg by mouth every evening., Disp: , Rfl:     VENTOLIN HFA 90 mcg/actuation inhaler, , Disp: , Rfl:     zonisamide (ZONEGRAN) 100 MG Cap, Take 200 mg by mouth once daily., Disp: , Rfl:   ALLERGIES:   Review of patient's allergies indicates:   Allergen Reactions    Chlorhexidine gluconate Hives    Betadine surgi-prep Rash    Latex, natural rubber Itching       VITAL SIGNS: /72   Pulse (!) 57   Ht 5' 7" (1.702 m)   Wt 101.2 kg (223 lb)   BMI 34.93 kg/m²        PHYSICAL EXAM /  HIP  PHYSICAL EXAMINATION  General:  The patient is alert and oriented x 3.  Mood is pleasant.  Observation of ears, eyes and nose reveal no gross abnormalities.  HEENT: NCAT, sclera nonicteric  Lungs: Respirations are equal and unlabored..    left HIP EXAMINATION     OBSERVATION / INSPECTION  Gait:   Antalgic  Alignment:  Neutral   Scars:   None   Muscle atrophy: None   Effusion:  None   Warmth:  None "   Discoloration:   None   Leg lengths:   Equal   Pelvis:   Level     TENDERNESS / CREPITUS (T/C):      T / C  Trochanteric bursa   + / -  Piriformis    - / -  SI joint    - / -  Psoas tendon   - / -  Rectus insertion  - / -  Adductor insertion  - / -  Pubic symphysis  - / -    ROM: (* = pain)    Flexion:    120 degrees  External rotation: 40 degrees  Internal rotation with axial load: 30 degrees  Internal rotation without axial load: 40 degrees  Abduction:  45 degrees  Adduction:   20 degrees    SPECIAL TESTS:  Pain w/ forced internal rotation (FADIR): -   Pain w/ forced external rotation (ALONZO): Absent   Circumduction test:    -  Stinchfield test:    Negative   Log roll:      Negative   Snapping hip (internal):   Negative   Sit-up pain:     Negative   Resisted sit-up pain:    Negative   Resisted sit-up with adductor contraction pain:  Negative   Step-down test:    +  Trendelenburg test:    Negative  Bridge test     +     EXTREMITY NEURO-VASCULAR EXAMINATION:   Sensation:  Grossly intact to light touch all dermatomal regions.   Motor Function:  Fully intact motor function at hip, knee, foot and ankle    DTRs;  quadriceps and  achilles 2+.  No clonus and downgoing Babinski.    Vascular status:  DP and PT pulses 2+, brisk capillary refill, symmetric.    Skin:  intact, compartments soft.    OTHER FINDINGS:      XRAYS reviewed and interpreted personally by me:  CE angle: 46  Crossover: Neg  CAM: +  Joint space narrowing: none  Tonnis: 2.6    MRI Pelvis w/out contrast obtained on 2/11/22 on a disc that the patient brought was independently reviewed by me and by my read shows minimal fraying of the glut med/min tendons at the insertion of the GT with minimal intramuscular edema. No evidence of complete tears of these tendons. GT bursa appears inflamed. Otherwise no other sig findings.     ASSESSMENT:    1. left greater trochanteric bursitis    PLAN:  1. Continue PT with [place] for her GT bursitis. Rx provided for  this.   2. NSAIDS prn  3. Will refer patient to nonop sports (Dr. Park) for US guided L GT bursa CSI  4. All questions were answered, pt will contact us for questions or concerns in the interim.  5. F/u with our clinic PRN, continued care with Dr. Park

## 2022-11-17 ENCOUNTER — TELEPHONE (OUTPATIENT)
Dept: SPORTS MEDICINE | Facility: CLINIC | Age: 62
End: 2022-11-17
Payer: MEDICARE

## 2022-11-17 NOTE — TELEPHONE ENCOUNTER
----- Message from Vickie Aguilar sent at 11/17/2022  1:58 PM CST -----  Regarding: ULTRASOUND INJECTION  Contact: Self  Pt stated she have a referral from Dr Canchola for an Ultrasound Guided Hip Injection - Left Hip Pt ask for a call to schedule      Contact info  [phone] (home)

## 2022-11-17 NOTE — TELEPHONE ENCOUNTER
Called and spoke to patient.  She is scheduled with Dr. Park for left hip injection at the [facility] location on 12/13/22

## 2022-11-29 ENCOUNTER — OFFICE VISIT (OUTPATIENT)
Dept: SPORTS MEDICINE | Facility: CLINIC | Age: 62
End: 2022-11-29
Payer: MEDICARE

## 2022-11-29 VITALS
WEIGHT: 222.44 LBS | SYSTOLIC BLOOD PRESSURE: 112 MMHG | HEIGHT: 67 IN | DIASTOLIC BLOOD PRESSURE: 76 MMHG | BODY MASS INDEX: 34.91 KG/M2

## 2022-11-29 DIAGNOSIS — M25.552 LEFT HIP PAIN: ICD-10-CM

## 2022-11-29 DIAGNOSIS — M70.62 GREATER TROCHANTERIC BURSITIS OF LEFT HIP: Primary | ICD-10-CM

## 2022-11-29 PROCEDURE — 99499 UNLISTED E&M SERVICE: CPT | Mod: S$PBB,,, | Performed by: STUDENT IN AN ORGANIZED HEALTH CARE EDUCATION/TRAINING PROGRAM

## 2022-11-29 PROCEDURE — 20611 DRAIN/INJ JOINT/BURSA W/US: CPT | Mod: PBBFAC,PN,LT | Performed by: STUDENT IN AN ORGANIZED HEALTH CARE EDUCATION/TRAINING PROGRAM

## 2022-11-29 PROCEDURE — 99999 PR PBB SHADOW E&M-EST. PATIENT-LVL IV: CPT | Mod: PBBFAC,,, | Performed by: STUDENT IN AN ORGANIZED HEALTH CARE EDUCATION/TRAINING PROGRAM

## 2022-11-29 PROCEDURE — 99499 NO LOS: ICD-10-PCS | Mod: S$PBB,,, | Performed by: STUDENT IN AN ORGANIZED HEALTH CARE EDUCATION/TRAINING PROGRAM

## 2022-11-29 PROCEDURE — 99214 OFFICE O/P EST MOD 30 MIN: CPT | Mod: PBBFAC,PN,25 | Performed by: STUDENT IN AN ORGANIZED HEALTH CARE EDUCATION/TRAINING PROGRAM

## 2022-11-29 PROCEDURE — 99999 PR PBB SHADOW E&M-EST. PATIENT-LVL IV: ICD-10-PCS | Mod: PBBFAC,,, | Performed by: STUDENT IN AN ORGANIZED HEALTH CARE EDUCATION/TRAINING PROGRAM

## 2022-11-29 PROCEDURE — 20611 LARGE JOINT ASPIRATION/INJECTION: L GREATER TROCHANTERIC BURSA: ICD-10-PCS | Mod: S$PBB,LT,, | Performed by: STUDENT IN AN ORGANIZED HEALTH CARE EDUCATION/TRAINING PROGRAM

## 2022-11-29 RX ORDER — TRIAMCINOLONE ACETONIDE 40 MG/ML
40 INJECTION, SUSPENSION INTRA-ARTICULAR; INTRAMUSCULAR
Status: DISCONTINUED | OUTPATIENT
Start: 2022-11-29 | End: 2022-11-29 | Stop reason: HOSPADM

## 2022-11-29 RX ADMIN — TRIAMCINOLONE ACETONIDE 40 MG: 40 INJECTION, SUSPENSION INTRA-ARTICULAR; INTRAMUSCULAR at 09:11

## 2022-11-29 NOTE — PROGRESS NOTES
CC: left hip pain    62 y.o. Female presents today for CSI injection of her left greater trochanteric bursa. Pt was referred by Dr. Canchola.     Attempted treatments: cane, HEP  Pain score: 5/10  History of trauma/injury: none since last visit with Dr. Canchola  Affecting ADLs: yes      REVIEW OF SYSTEMS:   Constitution: Patient denies fever or chills.  Eyes: Patient denies eye pain or vision changes.  HEENT: Patient denies ear pain, sore throat, or nasal discharge.  CVS: Patient denies chest pain.  Lungs: Patient denies shortness of breath or cough.  Skin: Patient denies skin rash or itching.    Musculoskeletal: Patient denies recent falls. See HPI.  Psych: Patient denies any current anxiety or nervousness.    PAST MEDICAL HISTORY:   Past Medical History:   Diagnosis Date    Arthritis     Asthma     Migraines 2015    PONV (postoperative nausea and vomiting)     Sleep apnea     does not wear cpap       MEDICATIONS:     Current Outpatient Medications:     buPROPion (WELLBUTRIN XL) 300 MG 24 hr tablet, Take 300 mg by mouth once daily., Disp: , Rfl:     fremanezumab-vfrm (AJOVY SYRINGE SUBQ), Inject into the skin every 30 days., Disp: , Rfl:     pantoprazole (PROTONIX) 40 MG tablet, Take 40 mg by mouth once daily., Disp: , Rfl:     propranoloL (INDERAL) 20 MG tablet, Take 20 mg by mouth 2 (two) times daily., Disp: , Rfl:     tiZANidine (ZANAFLEX) 4 MG tablet, Take 4 mg by mouth every 6 to 8 hours as needed., Disp: , Rfl:     trazodone (DESYREL) 50 MG tablet, Take 50 mg by mouth every evening., Disp: , Rfl:     zonisamide (ZONEGRAN) 100 MG Cap, Take 200 mg by mouth once daily., Disp: , Rfl:     butalbital-acetaminophen-caff -40 mg Cap, daily as needed., Disp: , Rfl:     carBAMazepine (TEGRETOL) 200 mg tablet, Take 200 mg by mouth 3 (three) times daily., Disp: , Rfl:     clonazePAM (KLONOPIN) 0.5 MG tablet, Take 0.5 mg by mouth once daily., Disp: , Rfl:     gabapentin (NEURONTIN) 300 MG capsule, gabapentin 300 mg capsule,  "Disp: , Rfl:     HYDROcodone-acetaminophen (NORCO)  mg per tablet, Take 1 tablet by mouth every 4 to 6 hours as needed for Pain. (Patient not taking: Reported on 11/29/2022), Disp: 40 tablet, Rfl: 0    minocycline (MINOCIN,DYNACIN) 100 MG capsule, Take 1 capsule (100 mg total) by mouth every 12 (twelve) hours. (Patient not taking: Reported on 11/29/2022), Disp: 28 capsule, Rfl: 0    ondansetron (ZOFRAN) 8 MG tablet, TAKE 1 TABLET BY MOUTH TWICE A DAY AS DIRECTED WITH EACH DOSE OF LEVODOPA, Disp: , Rfl:     ondansetron (ZOFRAN-ODT) 4 MG TbDL, dissolve 2 tablets (8 mg total) by mouth every 8 (eight) hours as needed (nausea). (Patient not taking: Reported on 11/29/2022), Disp: 25 tablet, Rfl: 1    ondansetron (ZOFRAN-ODT) 4 MG TbDL, Take 2 tablets (8 mg total) by mouth every 8 (eight) hours as needed (nausea). (Patient not taking: Reported on 11/29/2022), Disp: 10 tablet, Rfl: 1    prochlorperazine (COMPAZINE) 10 MG tablet, Take 10 mg by mouth 3 (three) times daily., Disp: , Rfl:     simvastatin (ZOCOR) 20 MG tablet, Take 20 mg by mouth every evening., Disp: , Rfl:     VENTOLIN HFA 90 mcg/actuation inhaler, , Disp: , Rfl:     ALLERGIES:   Review of patient's allergies indicates:   Allergen Reactions    Chlorhexidine gluconate Hives    Betadine surgi-prep Rash    Latex, natural rubber Itching        PHYSICAL EXAMINATION:  /76   Ht 5' 7" (1.702 m)   Wt 100.9 kg (222 lb 7.1 oz)   BMI 34.84 kg/m²   There are no signs of infection at the injection site, including no rubor, calor, or skin lesions.  Gen: NAD.  Psych: Affect & judgment nl.  Neuro: Grossly CNI. EPPERSON.  HEENT: -Trach dev. -Eye d/c.   CV: Color nl. -E/C/C. WWPx4.  Pulm: -Dyspnea. -Cough.  Lymph: -Edema.  Int: -Rash/lesion noted. Skin is warm and dry    ASSESSMENT:      ICD-10-CM ICD-9-CM   1. Greater trochanteric bursitis of left hip  M70.62 726.5   2. Left hip pain  M25.552 719.45         PLAN:  Ultrasound-guided injection of the left greater " trochanteric bursa with triamcinolone performed at visit today.    Future planning includes - Continue exercise program    Risks and benefits were discussed with patient prior to receiving injection.  Depending on injection type, risks include the possibility of infection, pain, disruptions in blood pressure and blood sugar, and cosmetic deformity at site of injection.    All questions were answered to the best of my ability and all concerns were addressed at this time.    Follow up prn      This note is dictated using the M*Modal Fluency Direct word recognition program. There are word recognition mistakes that are occasionally missed on review.

## 2022-11-29 NOTE — PROCEDURES
Large Joint Aspiration/Injection: L greater trochanteric bursa    Date/Time: 11/29/2022 9:45 AM  Performed by: Eloisa Park MD  Authorized by: Eloisa Park MD     Consent Done?:  Yes (Verbal)  Indications:  Pain and diagnostic evaluation  Site marked: the procedure site was marked    Timeout: prior to procedure the correct patient, procedure, and site was verified    Prep: patient was prepped and draped in usual sterile fashion      Local anesthesia used?: Yes    Anesthesia:  Local infiltration  Local anesthetic:  Co-phenylcaine spray    Details:  Needle Size:  22 G  Ultrasonic Guidance for needle placement?: Yes (Ultrasound guidance used to avoid neurovascular injury and/or to improve accuracy given body habitus.)    Images are saved and documented.  Approach:  Lateral  Location:  Hip  Site:  L greater trochanteric bursa  Medications:  40 mg triamcinolone acetonide 40 mg/mL  Medications comment:  Bupivacaine 0.25% 2mL  Patient tolerance:  Patient tolerated the procedure well with no immediate complications     TECHNIQUE: Real time ultrasound examination of the left greater trochanteric bursa(e)/gluteus medius tendon(s) was performed with Son[employer] Edge 2, C1-5 MHz probe(s). Ultrasound guidance was used for needle localization. Images were saved and stored for documentation. Dynamic visualization of the needle was continuous throughout the procedures and maintained in good position.

## 2022-12-08 ENCOUNTER — OFFICE VISIT (OUTPATIENT)
Dept: SPORTS MEDICINE | Facility: CLINIC | Age: 62
End: 2022-12-08
Payer: MEDICARE

## 2022-12-08 VITALS
BODY MASS INDEX: 34.84 KG/M2 | HEIGHT: 67 IN | DIASTOLIC BLOOD PRESSURE: 76 MMHG | WEIGHT: 222 LBS | SYSTOLIC BLOOD PRESSURE: 116 MMHG

## 2022-12-08 DIAGNOSIS — M25.551 CHRONIC RIGHT HIP PAIN: Primary | ICD-10-CM

## 2022-12-08 DIAGNOSIS — G89.29 CHRONIC RIGHT HIP PAIN: Primary | ICD-10-CM

## 2022-12-08 DIAGNOSIS — M70.61 GREATER TROCHANTERIC BURSITIS OF RIGHT HIP: ICD-10-CM

## 2022-12-08 PROCEDURE — 99999 PR PBB SHADOW E&M-EST. PATIENT-LVL IV: CPT | Mod: PBBFAC,,, | Performed by: STUDENT IN AN ORGANIZED HEALTH CARE EDUCATION/TRAINING PROGRAM

## 2022-12-08 PROCEDURE — 20611 DRAIN/INJ JOINT/BURSA W/US: CPT | Mod: PBBFAC | Performed by: STUDENT IN AN ORGANIZED HEALTH CARE EDUCATION/TRAINING PROGRAM

## 2022-12-08 PROCEDURE — 99999 PR PBB SHADOW E&M-EST. PATIENT-LVL IV: ICD-10-PCS | Mod: PBBFAC,,, | Performed by: STUDENT IN AN ORGANIZED HEALTH CARE EDUCATION/TRAINING PROGRAM

## 2022-12-08 PROCEDURE — 20611 LARGE JOINT ASPIRATION/INJECTION: R GREATER TROCHANTERIC BURSA: ICD-10-PCS | Mod: S$PBB,RT,, | Performed by: STUDENT IN AN ORGANIZED HEALTH CARE EDUCATION/TRAINING PROGRAM

## 2022-12-08 PROCEDURE — 99214 PR OFFICE/OUTPT VISIT, EST, LEVL IV, 30-39 MIN: ICD-10-PCS | Mod: 25,S$PBB,, | Performed by: STUDENT IN AN ORGANIZED HEALTH CARE EDUCATION/TRAINING PROGRAM

## 2022-12-08 PROCEDURE — 99214 OFFICE O/P EST MOD 30 MIN: CPT | Mod: PBBFAC,25 | Performed by: STUDENT IN AN ORGANIZED HEALTH CARE EDUCATION/TRAINING PROGRAM

## 2022-12-08 PROCEDURE — 99214 OFFICE O/P EST MOD 30 MIN: CPT | Mod: 25,S$PBB,, | Performed by: STUDENT IN AN ORGANIZED HEALTH CARE EDUCATION/TRAINING PROGRAM

## 2022-12-08 RX ORDER — TRIAMCINOLONE ACETONIDE 40 MG/ML
40 INJECTION, SUSPENSION INTRA-ARTICULAR; INTRAMUSCULAR
Status: DISCONTINUED | OUTPATIENT
Start: 2022-12-08 | End: 2022-12-08 | Stop reason: HOSPADM

## 2022-12-08 RX ADMIN — TRIAMCINOLONE ACETONIDE 40 MG: 40 INJECTION, SUSPENSION INTRA-ARTICULAR; INTRAMUSCULAR at 01:12

## 2022-12-08 NOTE — PROCEDURES
Large Joint Aspiration/Injection: R greater trochanteric bursa    Date/Time: 12/8/2022 1:45 PM  Performed by: Eloisa Park MD  Authorized by: Eloisa Park MD     Consent Done?:  Yes (Verbal)  Indications:  Pain and diagnostic evaluation  Site marked: the procedure site was marked    Timeout: prior to procedure the correct patient, procedure, and site was verified    Prep: patient was prepped and draped in usual sterile fashion      Local anesthesia used?: Yes    Anesthesia:  Local infiltration  Local anesthetic:  Co-phenylcaine spray    Details:  Needle Size:  22 G  Ultrasonic Guidance for needle placement?: Yes (Ultrasound guidance used to avoid neurovascular injury and/or to improve accuracy given body habitus.)    Images are saved and documented.  Approach:  Lateral  Location:  Hip  Site:  R greater trochanteric bursa  Medications:  40 mg triamcinolone acetonide 40 mg/mL  Medications comment:  Bupivacaine 0.25% 2mL  Patient tolerance:  Patient tolerated the procedure well with no immediate complications     TECHNIQUE: Real time ultrasound examination of the right greater trochanteric bursa(e)/gluteus medius tendon(s) was performed with Son[employer] [place] Edge 2, C1-5 MHz probe(s). Ultrasound guidance was used for needle localization. Images were saved and stored for documentation. Dynamic visualization of the needle was continuous throughout the procedures and maintained in good position.

## 2022-12-08 NOTE — PROGRESS NOTES
"CC: right hip pain    62 y.o. Female presents today for evaluation of her right hip pain. Pt reports gradual onset and gradual worsening of hip pain about 1 year ago. Pt reports pain with prolonged sitting, standing, and walking, as well as stairs. Pt reports pain is 3/10 today, and states she took a "pain pill" earlier. Pt localizes pain to lateral hip. Pt denies mechanical symptoms. Pt reports occasional numbness/tingling down right leg; pt reports prev hx of lumbar DDD and radiculopathy. Pt presents today with cane for ambulation.     Side: right  Problem: hip pain  Pain Score: 3/10  SYMPTOMS:   Time of onset: 1 year ago  Trauma, injury: gradual onset  Pain location: lateral    C-sign: no    Radiation past knee: "sometimes in my foot"    Exacerbating factors / difficult actions:    Nocturnal pain lying with ipsilateral side down: yes    Pivoting: "avoid that motion"    Putting on shoes / socks: no    Getting into / out of cars: yes    Getting up / down from chairs: yes    Known back problems: DDD, radiculopathy (lumbar)  Weakness: no  Numbness: intermittent  Mechanical symptoms:     Clicking, catching: no    Snapping internal: no    Snapping external: no    INTERVENTIONS:   Medications tried: oxycodone (every 6-8 hrs)  Physical therapy: "several years ago"  Injections: none; significant relief with L GTB CSI with Dr. Park    RELEVANT HISTORY:   Imaging to date: 11/16/22    Occupation: retired    REVIEW OF SYSTEMS:   Constitution: Patient denies fever or chills.  Eyes: Patient denies eye pain or vision changes.  HEENT: Patient denies ear pain, sore throat, or nasal discharge.  CVS: Patient denies chest pain.  Lungs: Patient denies shortness of breath or cough.  Abdomen: Patient denies any stomach pain, nausea, vomiting, or diarrhea  Skin: Patient denies skin rash or itching.    Musculoskeletal: Patient reports left rib pain.  Neuro: Patient denies any numbness or tingling in upper extremities.  Psych: Patient " denies any current anxiety or nervousness.    PAST MEDICAL HISTORY:   Past Medical History:   Diagnosis Date    Arthritis     Asthma     Migraines 2015    PONV (postoperative nausea and vomiting)     Sleep apnea     does not wear cpap       PAST SURGICAL HISTORY:  Past Surgical History:   Procedure Laterality Date    ANKLE SURGERY      right    ARTHROSCOPY OF SHOULDER WITH DECOMPRESSION OF SUBACROMIAL SPACE Right 3/22/2022    Procedure: ARTHROSCOPY, SHOULDER, WITH SUBACROMIAL SPACE DECOMPRESSION;  Surgeon: Hernán Apodaca MD;  Location: [facility];  Service: Orthopedics;  Laterality: Right;    ARTHROSCOPY OF SHOULDER WITH DECOMPRESSION OF SUBACROMIAL SPACE Right 10/6/2022    Procedure: ARTHROSCOPY, SHOULDER, WITH SUBACROMIAL SPACE DECOMPRESSION;  Surgeon: Hernán Apodaca MD;  Location: [facility] OR;  Service: Orthopedics;  Laterality: Right;    ARTHROSCOPY OF SHOULDER WITH REMOVAL OF DISTAL CLAVICLE Right 10/6/2022    Procedure: ARTHROSCOPY, SHOULDER, WITH DISTAL CLAVICLE EXCISION;  Surgeon: Hernán Apodaca MD;  Location: [facility] OR;  Service: Orthopedics;  Laterality: Right;    CARPAL TUNNEL RELEASE Right 10/9/2020    Procedure: RELEASE, CARPAL TUNNEL;  Surgeon: Claude S. Williams IV, MD;  Location: [facility] OR;  Service: Orthopedics;  Laterality: Right;    FIXATION OF TENDON Right 3/22/2022    Procedure: FIXATION, TENDON - BICEPS TENOTOMY;  Surgeon: Hernán Apodaca MD;  Location: [facility] OR;  Service: Orthopedics;  Laterality: Right;    INTERPOSITION ARTHROPLASTY OF CARPOMETACARPAL JOINTS Right 10/9/2020    Procedure: INTERPOSITION ARTHROPLASTY, CMC JOINT;  Surgeon: Claude S. Williams IV, MD;  Location: [facility] OR;  Service: Orthopedics;  Laterality: Right;    ROTATOR CUFF REPAIR Right 3/22/2022    Procedure: REPAIR, ROTATOR CUFF;  Surgeon: Hernán Apodaca MD;  Location: [facility];  Service: Orthopedics;  Laterality: Right;    ROTATOR CUFF REPAIR Right 10/6/2022    Procedure: REPAIR, ROTATOR CUFF WITH SMITH AND NEPHEW PATCH GRAFT;   Surgeon: Hernán Apodaca MD;  Location: [facility] OR;  Service: Orthopedics;  Laterality: Right;    SHOULDER ARTHROSCOPY Right 3/22/2022    Procedure: ARTHROSCOPY, SHOULDER;  Surgeon: Hernán Apodaca MD;  Location: [facility] OR;  Service: Orthopedics;  Laterality: Right;  GAITAN AND NEPHEW PATCH    SHOULDER ARTHROSCOPY Right 10/6/2022    Procedure: ARTHROSCOPY, SHOULDER;  Surgeon: Hernán Apodaca MD;  Location: [facility] OR;  Service: Orthopedics;  Laterality: Right;    SHOULDER SURGERY      left    TOTAL KNEE ARTHROPLASTY Bilateral 2007    right Jan. 2007; left Jan 2010       FAMILY HISTORY:  History reviewed. No pertinent family history.    SOCIAL HISTORY:  Social History     Socioeconomic History    Marital status:    Tobacco Use    Smoking status: Never    Smokeless tobacco: Never   Substance and Sexual Activity    Alcohol use: No    Drug use: Never       MEDICATIONS:     Current Outpatient Medications:     buPROPion (WELLBUTRIN XL) 300 MG 24 hr tablet, Take 300 mg by mouth once daily., Disp: , Rfl:     butalbital-acetaminophen-caff -40 mg Cap, daily as needed., Disp: , Rfl:     clonazePAM (KLONOPIN) 0.5 MG tablet, Take 0.5 mg by mouth once daily., Disp: , Rfl:     fremanezumab-vfrm (AJOVY SYRINGE SUBQ), Inject into the skin every 30 days., Disp: , Rfl:     gabapentin (NEURONTIN) 300 MG capsule, gabapentin 300 mg capsule, Disp: , Rfl:     HYDROcodone-acetaminophen (NORCO)  mg per tablet, Take 1 tablet by mouth every 4 to 6 hours as needed for Pain., Disp: 40 tablet, Rfl: 0    ondansetron (ZOFRAN) 8 MG tablet, TAKE 1 TABLET BY MOUTH TWICE A DAY AS DIRECTED WITH EACH DOSE OF LEVODOPA, Disp: , Rfl:     ondansetron (ZOFRAN-ODT) 4 MG TbDL, Take 2 tablets (8 mg total) by mouth every 8 (eight) hours as needed (nausea)., Disp: 10 tablet, Rfl: 1    pantoprazole (PROTONIX) 40 MG tablet, Take 40 mg by mouth once daily., Disp: , Rfl:     prochlorperazine (COMPAZINE) 10 MG tablet, Take 10 mg by mouth 3 (three) times  "daily., Disp: , Rfl:     tiZANidine (ZANAFLEX) 4 MG tablet, Take 4 mg by mouth every 6 to 8 hours as needed., Disp: , Rfl:     trazodone (DESYREL) 50 MG tablet, Take 50 mg by mouth every evening., Disp: , Rfl:     VENTOLIN HFA 90 mcg/actuation inhaler, , Disp: , Rfl:     zonisamide (ZONEGRAN) 100 MG Cap, Take 200 mg by mouth once daily., Disp: , Rfl:     carBAMazepine (TEGRETOL) 200 mg tablet, Take 200 mg by mouth 3 (three) times daily., Disp: , Rfl:     minocycline (MINOCIN,DYNACIN) 100 MG capsule, Take 1 capsule (100 mg total) by mouth every 12 (twelve) hours. (Patient not taking: Reported on 2022), Disp: 28 capsule, Rfl: 0    ondansetron (ZOFRAN-ODT) 4 MG TbDL, dissolve 2 tablets (8 mg total) by mouth every 8 (eight) hours as needed (nausea). (Patient not taking: Reported on 2022), Disp: 25 tablet, Rfl: 1    propranoloL (INDERAL) 20 MG tablet, Take 20 mg by mouth 2 (two) times daily., Disp: , Rfl:     simvastatin (ZOCOR) 20 MG tablet, Take 20 mg by mouth every evening., Disp: , Rfl:     ALLERGIES:   Review of patient's allergies indicates:   Allergen Reactions    Chlorhexidine gluconate Hives    Betadine surgi-prep Rash    Latex, natural rubber Itching      IMAGIN. Hip X-ray ordered due to right hip pain. 2 views taken 22.   2. X-ray images were reviewed personally by me and then directly with patient.  3. FINDINGS: Two views left hip.     There is osteopenia.  The sacroiliac joints are intact noting degenerative change.  The pubic symphysis is intact.  There are degenerative changes of the bilateral femoroacetabular joints without dislocation.  There are several scattered vascular phleboliths.  No acute displaced fracture or dislocation of the left hip.    4. IMPRESSION:  1. No acute displaced fracture or dislocation of the left hip.    PHYSICAL EXAMINATION:  /76   Ht 5' 7" (1.702 m)   Wt 100.7 kg (222 lb)   BMI 34.77 kg/m²   Vitals signs and nursing note have been " reviewed.    General: In no acute distress, well developed, well nourished, no diaphoresis  Eyes: EOM full and smooth, no eye redness or discharge  HEENT: normocephalic and atraumatic, neck supple, trachea midline, no nasal discharge  Cardiovascular: no LE edema  Lungs: respirations non-labored, no conversational dyspnea   Neuro: AAOx3, CN2-12 grossly intact  Skin: No rashes, warm and dry  Psychiatric: cooperative, pleasant, mood and affect appropriate for age    Right Hip:   Gait: slightly antalgic assisted with cane    Inspection/Palpation:   -Deformities     TTP:  +Greater trochanter  +Abductors near GT insertion  -ASIS  -AIIS     ROM:    Flexion & extension WNL & sym B/L  -Log roll B/L     Functional:    -ALONZO   -FADIR   -Snapping ext    Strength/Functional:  + 4+/5 in glut med / abductors with pain against resistance      ASSESSMENT:      ICD-10-CM ICD-9-CM   1. Chronic right hip pain  M25.551 719.45    G89.29 338.29   2. Greater trochanteric bursitis of right hip  M70.61 726.5         PLAN:  Based on patient history, physical exam findings, and imaging my working diagnosis is greater trochanteric bursitis of the patient's right hip.  Patient will be given an ultrasound-guided injection with triamcinolone to the right hip as this worked well for her left hip.      All questions were answered to the best of my ability and all concerns were addressed at this time.    Follow up p.r.n.      This note is dictated using the M*Modal Fluency Direct word recognition program. There are word recognition mistakes that are occasionally missed on review.

## 2025-02-03 ENCOUNTER — ANESTHESIA EVENT (OUTPATIENT)
Dept: SURGERY | Facility: OTHER | Age: 65
End: 2025-02-03
Payer: MEDICARE

## 2025-02-03 RX ORDER — OXYCODONE HYDROCHLORIDE 5 MG/1
10 CAPSULE ORAL EVERY 6 HOURS PRN
COMMUNITY

## 2025-02-03 NOTE — PRE ADMISSION SCREENING
Phone complete  Information to Prepare you for your Surgery    PRE-ADMIT TESTING & SURGERY  [address] BUILDING  ENTRANCE [address]       Your surgery has been scheduled at [facility]. We are pleased to have the opportunity to serve you. For Further Information please call [phone].    On the day of surgery please report to the Registration Desk on the 1st floor of the [facility]. This is a 4 story red brick building on the corner of [address]. Turn onto [address]  to access the parking lot behind the [facility] at the corner of [address].    CONTACT YOUR PHYSICIAN'S OFFICE THE DAY PRIOR TO YOUR SURGERY TO OBTAIN YOUR ARRIVAL TIME.     The evening before surgery do not eat anything after 9 p.m. ( this includes hard candy, chewing gum and mints).  You may only have GATORADE, POWERADE AND WATER  from 9 p.m. until you leave your home.   DO NOT DRINK ANY LIQUIDS ON THE WAY TO THE HOSPITAL.      Why does your anesthesiologist allow you to drink Gatorade/Powerade before surgery?  Gatorade/Powerade helps to increase your comfort before surgery and to decrease your nausea after surgery.   The carbohydrates in Gatorade/Powerade help reduce your body's stress response to surgery.    If you are a diabetic-drink only water prior to surgery.    Outpatient Surgery- May allow 2 adults (18 and older)/ Support Persons (1 being the designated ) for all surgical/procedural patients. A breastfeeding mother will be allowed her infant and 2 adult Support Persons. No one under the age of 18 will be allowed in the building.    MEDICATION INSTRUCTIONS:   TAKE AM OF SURGERY:  Protonix  Ubrelvy    Surgery Patients:  If you take ASPIRIN - Your PHYSICIAN/SURGEON will need to inform you IF/OR when you need to stop taking aspirin prior to your surgery.     Starting the week prior to surgery, do not take any medications containing IBUPROFEN or NSAIDS (Advil, Aleve, BC,  Goody's, Ketorolac, Meloxicam, Mobic, Motrin, Naproxen, Toradol, etc).  If you are not sure if you should take a medicine, please call your surgeon's office.  You may take Tylenol.    Do Not Wear any make-up (especially eye make-up) to surgery. Please remove any false eyelashes or eyelash extensions. If you arrive the day of surgery with makeup/eyelashes on you will be required to remove prior to surgery. (There is a risk of corneal abrasions if eye makeup/eyelash extensions are not removed)    Leave all valuables at home.   Do Not wear any jewelry or watches, including any metal in body piercings. Jewelry must be removed prior to coming to the hospital.  There is a possibility that rings that are unable to be removed may be cut off if they are on the surgical extremity.    Please remove all hair extensions, wigs, clips and any other metal accessories/ ornaments from your hair.  These items may pose a flammable/fire risk in Surgery and must be removed.    Do not shave your surgical area at least 5 days prior to your surgery. The surgical prep will be performed at the hospital according to Infection Control regulations.    Contact Lens must be removed before surgery. Either do not wear the contact lens or bring a case and solution for storage.  Please bring a container for eyeglasses or dentures as required.  Bring any paperwork your physician has provided, such as consent forms,  history and physicals, doctor's orders, etc.   Bring comfortable clothes that are loose fitting to wear upon discharge. Take into consideration the type of surgery being performed.  Maintain your diet as advised per your physician the day prior to surgery.    Adequate rest the night before surgery is advised.   Park in the Parking lot behind the hospital or in the [facility] across the street from the parking lot. Parking is complimentary.  If you will be discharged the same day as your procedure, please arrange for a  responsible adult to drive you home or to accompany you if traveling by taxi.   YOU WILL NOT BE PERMITTED TO DRIVE OR TO LEAVE THE HOSPITAL ALONE AFTER SURGERY.   If you are being discharged the same day, it is strongly recommended that you arrange for someone to remain with you for the first 24 hrs following your surgery.    The Surgeon will speak to your family/visitor after your surgery regarding the outcome of your surgery and post op care.  The Surgeon may speak to you after your surgery, but there is a possibility you may not remember the details.  Please check with your family members regarding the conversation with the Surgeon.    We strongly recommend whoever is bringing you home be present for discharge instructions.  This will ensure a thorough understanding for your post op home care.    If the patient has fever, cough, or signs/symptoms of Flu or Covid please do not come in for your surgery.   Contact your surgeon and your primary care physician for further instructions.               Bathing Instructions  Shower (no bath) the evening before and the morning of your procedure with antibacterial soap.  Wash your face with water and your regular face wash/soap  Use your regular shampoo  Dry off as usual Do not use any deodorant, powder, body lotions, perfume, after shave or cologne.     Thanks,  VICTOR M Hall

## 2025-02-04 ENCOUNTER — ANESTHESIA (OUTPATIENT)
Dept: SURGERY | Facility: OTHER | Age: 65
End: 2025-02-04
Payer: MEDICARE

## 2025-02-04 ENCOUNTER — HOSPITAL ENCOUNTER (OUTPATIENT)
Facility: OTHER | Age: 65
Discharge: HOME | End: 2025-02-04
Attending: ORTHOPAEDIC SURGERY | Admitting: ORTHOPAEDIC SURGERY
Payer: MEDICARE

## 2025-02-04 VITALS
SYSTOLIC BLOOD PRESSURE: 125 MMHG | RESPIRATION RATE: 16 BRPM | HEART RATE: 89 BPM | BODY MASS INDEX: 29.82 KG/M2 | TEMPERATURE: 98 F | HEIGHT: 67 IN | WEIGHT: 190 LBS | DIASTOLIC BLOOD PRESSURE: 60 MMHG | OXYGEN SATURATION: 96 %

## 2025-02-04 DIAGNOSIS — Z01.818 PREOP TESTING: ICD-10-CM

## 2025-02-04 DIAGNOSIS — S42.411A: ICD-10-CM

## 2025-02-04 DIAGNOSIS — S42.411A CLOSED SUPRACONDYLAR FRACTURE OF RIGHT HUMERUS, INITIAL ENCOUNTER: Primary | ICD-10-CM

## 2025-02-04 LAB
ANION GAP SERPL CALC-SCNC: 11 MMOL/L (ref 8–16)
BASOPHILS # BLD AUTO: 0.07 K/UL (ref 0–0.2)
BASOPHILS NFR BLD: 0.9 % (ref 0–1.9)
BUN SERPL-MCNC: 15 MG/DL (ref 8–23)
CALCIUM SERPL-MCNC: 10.3 MG/DL (ref 8.7–10.5)
CHLORIDE SERPL-SCNC: 108 MMOL/L (ref 95–110)
CO2 SERPL-SCNC: 25 MMOL/L (ref 23–29)
CREAT SERPL-MCNC: 0.7 MG/DL (ref 0.5–1.4)
DIFFERENTIAL METHOD BLD: ABNORMAL
EOSINOPHIL # BLD AUTO: 0.1 K/UL (ref 0–0.5)
EOSINOPHIL NFR BLD: 0.8 % (ref 0–8)
ERYTHROCYTE [DISTWIDTH] IN BLOOD BY AUTOMATED COUNT: 13.5 % (ref 11.5–14.5)
EST. GFR  (NO RACE VARIABLE): >60 ML/MIN/1.73 M^2
GLUCOSE SERPL-MCNC: 108 MG/DL (ref 70–110)
HCT VFR BLD AUTO: 41.6 % (ref 37–48.5)
HGB BLD-MCNC: 13.7 G/DL (ref 12–16)
IMM GRANULOCYTES # BLD AUTO: 0.02 K/UL (ref 0–0.04)
IMM GRANULOCYTES NFR BLD AUTO: 0.3 % (ref 0–0.5)
LYMPHOCYTES # BLD AUTO: 1.2 K/UL (ref 1–4.8)
LYMPHOCYTES NFR BLD: 15.9 % (ref 18–48)
MCH RBC QN AUTO: 31.3 PG (ref 27–31)
MCHC RBC AUTO-ENTMCNC: 32.9 G/DL (ref 32–36)
MCV RBC AUTO: 95 FL (ref 82–98)
MONOCYTES # BLD AUTO: 0.3 K/UL (ref 0.3–1)
MONOCYTES NFR BLD: 3.6 % (ref 4–15)
NEUTROPHILS # BLD AUTO: 5.9 K/UL (ref 1.8–7.7)
NEUTROPHILS NFR BLD: 78.5 % (ref 38–73)
NRBC BLD-RTO: 0 /100 WBC
PLATELET # BLD AUTO: 233 K/UL (ref 150–450)
PMV BLD AUTO: 9.1 FL (ref 9.2–12.9)
POTASSIUM SERPL-SCNC: 4.1 MMOL/L (ref 3.5–5.1)
RBC # BLD AUTO: 4.38 M/UL (ref 4–5.4)
SODIUM SERPL-SCNC: 144 MMOL/L (ref 136–145)
WBC # BLD AUTO: 7.55 K/UL (ref 3.9–12.7)

## 2025-02-04 PROCEDURE — 63600175 PHARM REV CODE 636 W HCPCS: Performed by: ORTHOPAEDIC SURGERY

## 2025-02-04 PROCEDURE — 63600175 PHARM REV CODE 636 W HCPCS: Performed by: NURSE ANESTHETIST, CERTIFIED REGISTERED

## 2025-02-04 PROCEDURE — 36000710: Performed by: ORTHOPAEDIC SURGERY

## 2025-02-04 PROCEDURE — 25000003 PHARM REV CODE 250: Performed by: NURSE ANESTHETIST, CERTIFIED REGISTERED

## 2025-02-04 PROCEDURE — 25000003 PHARM REV CODE 250: Performed by: ANESTHESIOLOGY

## 2025-02-04 PROCEDURE — 63600175 PHARM REV CODE 636 W HCPCS

## 2025-02-04 PROCEDURE — 71000015 HC POSTOP RECOV 1ST HR: Performed by: ORTHOPAEDIC SURGERY

## 2025-02-04 PROCEDURE — 37000008 HC ANESTHESIA 1ST 15 MINUTES: Performed by: ORTHOPAEDIC SURGERY

## 2025-02-04 PROCEDURE — 63600175 PHARM REV CODE 636 W HCPCS: Performed by: ANESTHESIOLOGY

## 2025-02-04 PROCEDURE — 64450 NJX AA&/STRD OTHER PN/BRANCH: CPT | Performed by: ANESTHESIOLOGY

## 2025-02-04 PROCEDURE — 80048 BASIC METABOLIC PNL TOTAL CA: CPT | Performed by: ORTHOPAEDIC SURGERY

## 2025-02-04 PROCEDURE — 63600175 PHARM REV CODE 636 W HCPCS: Mod: TB,JZ | Performed by: NURSE ANESTHETIST, CERTIFIED REGISTERED

## 2025-02-04 PROCEDURE — C1713 ANCHOR/SCREW BN/BN,TIS/BN: HCPCS | Performed by: ORTHOPAEDIC SURGERY

## 2025-02-04 PROCEDURE — 25000003 PHARM REV CODE 250

## 2025-02-04 PROCEDURE — 27201423 OPTIME MED/SURG SUP & DEVICES STERILE SUPPLY: Performed by: ORTHOPAEDIC SURGERY

## 2025-02-04 PROCEDURE — 37000009 HC ANESTHESIA EA ADD 15 MINS: Performed by: ORTHOPAEDIC SURGERY

## 2025-02-04 PROCEDURE — 85025 COMPLETE CBC W/AUTO DIFF WBC: CPT | Performed by: ORTHOPAEDIC SURGERY

## 2025-02-04 PROCEDURE — 36415 COLL VENOUS BLD VENIPUNCTURE: CPT | Performed by: ORTHOPAEDIC SURGERY

## 2025-02-04 PROCEDURE — 71000016 HC POSTOP RECOV ADDL HR: Performed by: ORTHOPAEDIC SURGERY

## 2025-02-04 PROCEDURE — C1776 JOINT DEVICE (IMPLANTABLE): HCPCS | Performed by: ORTHOPAEDIC SURGERY

## 2025-02-04 PROCEDURE — 71000039 HC RECOVERY, EACH ADD'L HOUR: Performed by: ORTHOPAEDIC SURGERY

## 2025-02-04 PROCEDURE — 36000711: Performed by: ORTHOPAEDIC SURGERY

## 2025-02-04 PROCEDURE — 71000033 HC RECOVERY, INTIAL HOUR: Performed by: ORTHOPAEDIC SURGERY

## 2025-02-04 DEVICE — SCREW BONE LOCK VA 2.7X30MM: Type: IMPLANTABLE DEVICE | Site: HUMERUS | Status: FUNCTIONAL

## 2025-02-04 DEVICE — SCREW LOCKING 3.5 X 22: Type: IMPLANTABLE DEVICE | Site: HUMERUS | Status: FUNCTIONAL

## 2025-02-04 DEVICE — SCREW BONE LOCK VA 2.7X40MM: Type: IMPLANTABLE DEVICE | Site: HUMERUS | Status: FUNCTIONAL

## 2025-02-04 DEVICE — SCREW LOCKING BONE 2.7X22MM: Type: IMPLANTABLE DEVICE | Site: HUMERUS | Status: FUNCTIONAL

## 2025-02-04 DEVICE — SCREW CORTEX 3.5 X 26: Type: IMPLANTABLE DEVICE | Site: HUMERUS | Status: FUNCTIONAL

## 2025-02-04 DEVICE — SCREW BONE VA LK 2.7X34MM: Type: IMPLANTABLE DEVICE | Site: HUMERUS | Status: FUNCTIONAL

## 2025-02-04 DEVICE — IMPLANTABLE DEVICE: Type: IMPLANTABLE DEVICE | Site: HUMERUS | Status: FUNCTIONAL

## 2025-02-04 DEVICE — SCREW CORTEX 3.5 X 24: Type: IMPLANTABLE DEVICE | Site: HUMERUS | Status: FUNCTIONAL

## 2025-02-04 DEVICE — SCREW CORTEX 2.7 X 20.: Type: IMPLANTABLE DEVICE | Site: HUMERUS | Status: FUNCTIONAL

## 2025-02-04 DEVICE — SCREW BONE LOCK VA 2.7X44MM: Type: IMPLANTABLE DEVICE | Site: HUMERUS | Status: FUNCTIONAL

## 2025-02-04 DEVICE — SCREW BONE LOCK VA 2.7X26MM: Type: IMPLANTABLE DEVICE | Site: HUMERUS | Status: FUNCTIONAL

## 2025-02-04 DEVICE — SCREW LOCKING 3.5MM X 24MM: Type: IMPLANTABLE DEVICE | Site: HUMERUS | Status: FUNCTIONAL

## 2025-02-04 RX ORDER — PROCHLORPERAZINE EDISYLATE 5 MG/ML
5 INJECTION INTRAMUSCULAR; INTRAVENOUS EVERY 30 MIN PRN
Status: DISCONTINUED | OUTPATIENT
Start: 2025-02-04 | End: 2025-02-05 | Stop reason: HOSPADM

## 2025-02-04 RX ORDER — ONDANSETRON 4 MG/1
8 TABLET, ORALLY DISINTEGRATING ORAL EVERY 8 HOURS PRN
Qty: 10 TABLET | Refills: 1 | Status: SHIPPED | OUTPATIENT
Start: 2025-02-04

## 2025-02-04 RX ORDER — DIPHENHYDRAMINE HYDROCHLORIDE 50 MG/ML
12.5 INJECTION INTRAMUSCULAR; INTRAVENOUS EVERY 30 MIN PRN
Status: DISCONTINUED | OUTPATIENT
Start: 2025-02-04 | End: 2025-02-05 | Stop reason: HOSPADM

## 2025-02-04 RX ORDER — MIDAZOLAM HYDROCHLORIDE 1 MG/ML
INJECTION INTRAMUSCULAR; INTRAVENOUS
Status: DISCONTINUED | OUTPATIENT
Start: 2025-02-04 | End: 2025-02-04

## 2025-02-04 RX ORDER — ROPIVACAINE HYDROCHLORIDE 5 MG/ML
INJECTION, SOLUTION EPIDURAL; INFILTRATION; PERINEURAL
Status: COMPLETED | OUTPATIENT
Start: 2025-02-04 | End: 2025-02-04

## 2025-02-04 RX ORDER — ACETAMINOPHEN 10 MG/ML
1000 INJECTION, SOLUTION INTRAVENOUS ONCE
Status: COMPLETED | OUTPATIENT
Start: 2025-02-04 | End: 2025-02-04

## 2025-02-04 RX ORDER — LIDOCAINE HYDROCHLORIDE 10 MG/ML
INJECTION, SOLUTION EPIDURAL; INFILTRATION; INTRACAUDAL; PERINEURAL
Status: COMPLETED | OUTPATIENT
Start: 2025-02-04 | End: 2025-02-04

## 2025-02-04 RX ORDER — CEFAZOLIN 2 G/1
2 INJECTION, POWDER, FOR SOLUTION INTRAMUSCULAR; INTRAVENOUS
Status: COMPLETED | OUTPATIENT
Start: 2025-02-04 | End: 2025-02-04

## 2025-02-04 RX ORDER — DEXAMETHASONE SODIUM PHOSPHATE 4 MG/ML
INJECTION, SOLUTION INTRA-ARTICULAR; INTRALESIONAL; INTRAMUSCULAR; INTRAVENOUS; SOFT TISSUE
Status: DISCONTINUED | OUTPATIENT
Start: 2025-02-04 | End: 2025-02-04

## 2025-02-04 RX ORDER — TRANEXAMIC ACID 100 MG/ML
INJECTION, SOLUTION INTRAVENOUS
Status: DISCONTINUED | OUTPATIENT
Start: 2025-02-04 | End: 2025-02-04

## 2025-02-04 RX ORDER — TRANEXAMIC ACID 100 MG/ML
1000 INJECTION, SOLUTION INTRAVENOUS
Status: DISCONTINUED | OUTPATIENT
Start: 2025-02-04 | End: 2025-02-05 | Stop reason: HOSPADM

## 2025-02-04 RX ORDER — OXYCODONE HYDROCHLORIDE 5 MG/1
5 TABLET ORAL ONCE
Status: COMPLETED | OUTPATIENT
Start: 2025-02-04 | End: 2025-02-04

## 2025-02-04 RX ORDER — ROCURONIUM BROMIDE 10 MG/ML
INJECTION, SOLUTION INTRAVENOUS
Status: DISCONTINUED | OUTPATIENT
Start: 2025-02-04 | End: 2025-02-04

## 2025-02-04 RX ORDER — FENTANYL CITRATE 50 UG/ML
INJECTION, SOLUTION INTRAMUSCULAR; INTRAVENOUS
Status: DISCONTINUED | OUTPATIENT
Start: 2025-02-04 | End: 2025-02-04

## 2025-02-04 RX ORDER — SODIUM CHLORIDE, SODIUM LACTATE, POTASSIUM CHLORIDE, CALCIUM CHLORIDE 600; 310; 30; 20 MG/100ML; MG/100ML; MG/100ML; MG/100ML
INJECTION, SOLUTION INTRAVENOUS CONTINUOUS
Status: DISCONTINUED | OUTPATIENT
Start: 2025-02-04 | End: 2025-02-05 | Stop reason: HOSPADM

## 2025-02-04 RX ORDER — ONDANSETRON HYDROCHLORIDE 2 MG/ML
INJECTION, SOLUTION INTRAVENOUS
Status: DISCONTINUED | OUTPATIENT
Start: 2025-02-04 | End: 2025-02-04

## 2025-02-04 RX ORDER — GLUCAGON 1 MG
1 KIT INJECTION
Status: DISCONTINUED | OUTPATIENT
Start: 2025-02-04 | End: 2025-02-05 | Stop reason: HOSPADM

## 2025-02-04 RX ORDER — LIDOCAINE HYDROCHLORIDE 10 MG/ML
0.5 INJECTION, SOLUTION EPIDURAL; INFILTRATION; INTRACAUDAL; PERINEURAL ONCE
Status: DISCONTINUED | OUTPATIENT
Start: 2025-02-04 | End: 2025-02-05 | Stop reason: HOSPADM

## 2025-02-04 RX ORDER — HYDROMORPHONE HYDROCHLORIDE 2 MG/ML
INJECTION, SOLUTION INTRAMUSCULAR; INTRAVENOUS; SUBCUTANEOUS
Status: DISCONTINUED | OUTPATIENT
Start: 2025-02-04 | End: 2025-02-04

## 2025-02-04 RX ORDER — SODIUM CHLORIDE 0.9 % (FLUSH) 0.9 %
3 SYRINGE (ML) INJECTION
Status: DISCONTINUED | OUTPATIENT
Start: 2025-02-04 | End: 2025-02-05 | Stop reason: HOSPADM

## 2025-02-04 RX ORDER — PROPOFOL 10 MG/ML
VIAL (ML) INTRAVENOUS
Status: DISCONTINUED | OUTPATIENT
Start: 2025-02-04 | End: 2025-02-04

## 2025-02-04 RX ORDER — HYDROMORPHONE HYDROCHLORIDE 2 MG/ML
0.4 INJECTION, SOLUTION INTRAMUSCULAR; INTRAVENOUS; SUBCUTANEOUS EVERY 5 MIN PRN
Status: DISCONTINUED | OUTPATIENT
Start: 2025-02-04 | End: 2025-02-05 | Stop reason: HOSPADM

## 2025-02-04 RX ORDER — OXYCODONE HYDROCHLORIDE 5 MG/1
5 TABLET ORAL EVERY 4 HOURS PRN
Status: DISCONTINUED | OUTPATIENT
Start: 2025-02-04 | End: 2025-02-05 | Stop reason: HOSPADM

## 2025-02-04 RX ADMIN — ROCURONIUM BROMIDE 20 MG: 10 INJECTION INTRAVENOUS at 12:02

## 2025-02-04 RX ADMIN — ROCURONIUM BROMIDE 20 MG: 10 INJECTION INTRAVENOUS at 01:02

## 2025-02-04 RX ADMIN — SODIUM CHLORIDE, SODIUM LACTATE, POTASSIUM CHLORIDE, AND CALCIUM CHLORIDE: .6; .31; .03; .02 INJECTION, SOLUTION INTRAVENOUS at 11:02

## 2025-02-04 RX ADMIN — ONDANSETRON HYDROCHLORIDE 4 MG: 2 INJECTION INTRAMUSCULAR; INTRAVENOUS at 10:02

## 2025-02-04 RX ADMIN — FENTANYL CITRATE 100 MCG: 50 INJECTION, SOLUTION INTRAMUSCULAR; INTRAVENOUS at 11:02

## 2025-02-04 RX ADMIN — LIDOCAINE HYDROCHLORIDE 100 MG: 10 INJECTION, SOLUTION EPIDURAL; INFILTRATION; INTRACAUDAL; PERINEURAL at 11:02

## 2025-02-04 RX ADMIN — HYDROMORPHONE HYDROCHLORIDE 0.4 MG: 2 INJECTION INTRAMUSCULAR; INTRAVENOUS; SUBCUTANEOUS at 04:02

## 2025-02-04 RX ADMIN — ROPIVACAINE HYDROCHLORIDE 20 ML: 5 INJECTION, SOLUTION EPIDURAL; INFILTRATION; PERINEURAL at 11:02

## 2025-02-04 RX ADMIN — ROCURONIUM BROMIDE 50 MG: 10 INJECTION INTRAVENOUS at 11:02

## 2025-02-04 RX ADMIN — CEFAZOLIN 2 G: 2 INJECTION, POWDER, FOR SOLUTION INTRAMUSCULAR; INTRAVENOUS at 11:02

## 2025-02-04 RX ADMIN — OXYCODONE HYDROCHLORIDE 5 MG: 5 TABLET ORAL at 05:02

## 2025-02-04 RX ADMIN — GLYCOPYRROLATE 0.1 MG: 0.2 INJECTION, SOLUTION INTRAMUSCULAR; INTRAVITREAL at 11:02

## 2025-02-04 RX ADMIN — OXYCODONE HYDROCHLORIDE 5 MG: 5 TABLET ORAL at 03:02

## 2025-02-04 RX ADMIN — HYDROMORPHONE HYDROCHLORIDE 0.4 MG: 2 INJECTION INTRAMUSCULAR; INTRAVENOUS; SUBCUTANEOUS at 02:02

## 2025-02-04 RX ADMIN — ACETAMINOPHEN 1000 MG: 10 INJECTION INTRAVENOUS at 04:02

## 2025-02-04 RX ADMIN — SODIUM CHLORIDE, SODIUM LACTATE, POTASSIUM CHLORIDE, AND CALCIUM CHLORIDE: .6; .31; .03; .02 INJECTION, SOLUTION INTRAVENOUS at 01:02

## 2025-02-04 RX ADMIN — TRANEXAMIC ACID 1000 MG: 100 INJECTION, SOLUTION INTRAVENOUS at 11:02

## 2025-02-04 RX ADMIN — CARBOXYMETHYLCELLULOSE SODIUM 2 DROP: 2.5 SOLUTION/ DROPS OPHTHALMIC at 11:02

## 2025-02-04 RX ADMIN — PROPOFOL 200 MG: 10 INJECTION, EMULSION INTRAVENOUS at 11:02

## 2025-02-04 RX ADMIN — ROPIVACAINE HYDROCHLORIDE 10 ML: 5 INJECTION, SOLUTION EPIDURAL; INFILTRATION; PERINEURAL at 11:02

## 2025-02-04 RX ADMIN — MIDAZOLAM HYDROCHLORIDE 2 MG: 1 INJECTION INTRAMUSCULAR; INTRAVENOUS at 11:02

## 2025-02-04 RX ADMIN — TRANEXAMIC ACID 1000 MG: 100 INJECTION, SOLUTION INTRAVENOUS at 02:02

## 2025-02-04 RX ADMIN — HYDROMORPHONE HYDROCHLORIDE 0.2 MG: 2 INJECTION INTRAMUSCULAR; INTRAVENOUS; SUBCUTANEOUS at 02:02

## 2025-02-04 RX ADMIN — DEXAMETHASONE SODIUM PHOSPHATE 8 MG: 4 INJECTION, SOLUTION INTRAMUSCULAR; INTRAVENOUS at 11:02

## 2025-02-04 NOTE — OP NOTE
[facility] ([facility]  Surgery Department  Operative Note    SUMMARY     Date of Procedure: 2/4/2025     Procedure:   Open reduction internal fixation right supracondylar humerus fracture  Right elbow ulnar nerve decompression    Surgeons and Role:     * Hernán Apodaca MD - Primary    Assistant: Syd SIMPSON    Pre-Operative Diagnosis:   Right supracondylar humerus fracture with intercondylar split    Post-Operative Diagnosis:   Same    Anesthesia: General + regional    Technical Procedures Used: Ms. Downing was brought to the operating room 02/04/2025 for planned open reduction internal fixation of a displaced right supracondylar humerus fracture.  She was given 2 g of Ancef as well as a regional nerve block and 1 g of TXA preoperatively.  She was brought to the operating room and placed in the supine position.  General endotracheal anesthesia was administered.  She was then carefully placed in the prone position.  Her right arm was placed on an armboard it was which was well supported.  The right elbow was then prepped with alcohol as well as peroxide given severe allergies to chlorhexidine as well as Betadine.  After the time-out was performed a nonsterile tourniquet was placed an Ioban was placed over the surgical site and the arm was exsanguinated with an Esmarch.  The tourniquet was insufflated to 250 mmHg for 2 hours and 5 minutes.      A posterior based incision was then made.  A    Triceps shuttle approach was performed with a vertical incision down to the level of the olecranon which then curved radially around the olecranon tip and then down the ulnar shaft.  Thick flaps were raised medially and laterally.  The intramuscular septum between the medial and lateral aspect of the triceps was then developed.      I then turned my attention to finding the ulnar nerve.  The cubital tunnel was identified and with careful dissection the ulnar nerve was identified and protected throughout the  case.  It was widely decompressed up into the medial arm and then down past the cubital tunnel and was allowed to be free with no tension and protected throughout the case.    After the ulnar nerve was localized a paratrooper septal approach was then performed creating medial and lateral windows beneath the triceps.  No olecranon osteotomy was deemed necessary.  A Neal elevator was then used to carefully elevate the soft tissue off the posterior humerus.  The radial nerve and neurovascular bundle was identified along the lateral aspect of the humerus and protected throughout the case.    A Lion jaw reduction clamp was then used after debriding the fracture site and a preliminary reduction was achieved with a K-wire.  There was an oblique type fracture which extended just lateral to the joint but there was also a posterior shear type fracture along the posterior/lateral aspect of the capitellum.    I decided to reduce the major fragments 1st.  After a preliminary K-wire fixation was achieved I placed the Synthes locking medial plate.  This was pinned into place and C-arm fluoroscopy was used throughout the case to confirm appropriate positioning.  The position of the plate was not amenable to a lag type screw fixation so cortical screws were placed proximal and distal to the fracture with good purchase.  The remaining screw holes were filled with locking screws.    I initially trialed a posterior/lateral base plate but the lateral specific plate seemed to fit better and so a parallel plating construct was then used.  In a similar fashion cortical screws were placed proximal and distal to the fracture and the remainder of the screws were lockers.  Finally AP to a screw was placed through the shear fragment posteriorly reducing it down nicely.  The elbow was then taken through full range of motion.  I was able to flex the elbow to 120° and get full extension with no impingement.    The wound was then copiously  irrigated.  Any bleeding was controlled with the electrocautery.  The triceps fascia was then closed down to the intermuscular septum.  The ulnar nerve was kept in the cubital tunnel and was closed proximally but left open distally in a decompress fashion.  The elbow was ranged in the ulnar nerve was confirmed not to have any undue tension.    The fascia above the triceps was closed in a separate layer.  The deep fatty layer was also closed with the interrupted Vicryl sutures.  Subcutaneous tissues closed with Vicryl the skin was closed with a running subcuticular Monocryl stitch followed by Dermabond and a well-padded posterior splint.      This patient was then extubated in the operating room and taken to the PACU without complication    Description of the Findings of the Procedure:  Synthes medial and lateral distal humeral locking plates with a combination of 3.5 cortical and locking screws as well as 2.7 cortical and locking screws    Significant Surgical Tasks Conducted by the Assistant(s), if Applicable:  Positioning, prepping, draping, reduction, instrumentation, closure, splint    Complications: No    Estimated Blood Loss (EBL): 35cc           Implants:   Implant Name Type Inv. Item Serial No.  Lot No. LRB No. Used Action   PLATE VA LCP HUM DST 4H R LN - [id number]  PLATE VA LCP HUM DST 4H R LN  [employer]  Right 1 Implanted   2.7 MM/ 3.5 MM VARIABLE ANGLE LCP LATERAL DISTAL HUMERUS PLATE    [employer]  Right 1 Implanted   SCREW METAPHYSEAL 2.7X28MM - [id number]  SCREW METAPHYSEAL 2.7X28MM  [employer]  Right 1 Implanted   SCREW ELB STARDRV T8 30X2.7MM - [id number]  SCREW ELB STARDRV T8 30X2.7MM  [employer]  Right 1 Implanted   SCREW BONE LOCK VA 2.7X40MM - [id number]  SCREW BONE LOCK VA 2.7X40MM  [employer]  Right 2 Implanted   SCREW BONE LOCK VA 2.7X44MM - [id number]  SCREW BONE LOCK VA 2.7X44MM  [employer]  Right 1 Implanted   SCREW BONE LOCK VA 2.7X26MM - [id number]  SCREW BONE LOCK VA  2.7X26MM  [employer]  Right 2 Implanted   K-WIRE TRCR PT1.6MM LELAND 150MM - [id number]  K-WIRE TRCR PT1.6MM LELAND 150MM  [employer]  Right 1 Implanted and Explanted   SCREW CORTEX 3.5 X 24 - [id number]  SCREW CORTEX 3.5 X 24  [employer]  Right 1 Implanted   SCREW LOCKING 3.5MM X 24MM - [id number]  SCREW LOCKING 3.5MM X 24MM  [employer]  Right 4 Implanted   SCREW LOCKING 3.5 X 22 - [id number]  SCREW LOCKING 3.5 X 22  [employer]  Right 2 Implanted   SCREW CORTEX 3.5 X 26 - [id number]  SCREW CORTEX 3.5 X 26  [employer]  Right 2 Implanted   SCREW CORTEX 2.7 X 20. - [id number]  SCREW CORTEX 2.7 X 20.  [employer]  Right 1 Implanted   SCREW LOCKING BONE 2.7X22MM - [id number]  SCREW LOCKING BONE 2.7X22MM  [employer]  Right 1 Implanted   SCREW BONE LOCK VA 2.7X30MM - [id number]  SCREW BONE LOCK VA 2.7X30MM  [employer]  Right 1 Implanted   SCREW BONE VA LK 2.7X34MM - [id number]  SCREW BONE VA LK 2.7X34MM  [employer]  Right 1 Implanted       Specimens:   Specimen (24h ago, onward)      None                    Condition: Good    Disposition: PACU - hemodynamically stable.    Attestation: I was present and scrubbed for the entire procedure.    Discharge Note    SUMMARY     Admit Date: 2/4/2025    Discharge Date and Time:  02/04/2025 2:58 PM    Hospital Course (synopsis of major diagnoses, care, treatment, and services provided during the course of the hospital stay): outpt     Final Diagnosis: Post-Op Diagnosis Codes:     * Closed supracondylar fracture of right elbow, initial encounter [S42.411A]    Disposition: Home or Self Care    Follow Up/Patient Instructions:     Medications:  Reconciled Home Medications:      Medication List        START taking these medications      ondansetron 4 MG Tbdl  Commonly known as: ZOFRAN-ODT  Take 2 tablets (8 mg total) by mouth every 8 (eight) hours as needed (nausea).            CONTINUE taking these medications      AJOVY SYRINGE SUBQ  Inject into the skin every 30 days.     buPROPion 300 MG 24 hr tablet  Commonly  known as: WELLBUTRIN XL  Take 300 mg by mouth once daily.     butalbital-acetaminophen-caff -40 mg Cap  daily as needed.     clonazePAM 0.5 MG tablet  Commonly known as: KlonoPIN  Take 0.5 mg by mouth as needed (migraines).     ondansetron 8 MG tablet  Commonly known as: ZOFRAN  TAKE 1 TABLET BY MOUTH TWICE A DAY AS DIRECTED WITH EACH DOSE OF LEVODOPA     oxyCODONE 5 mg Cap  Commonly known as: OXY-IR  Take 10 mg by mouth every 6 (six) hours as needed for Pain.     pantoprazole 40 MG tablet  Commonly known as: PROTONIX  Take 40 mg by mouth once daily.     prochlorperazine 10 MG tablet  Commonly known as: COMPAZINE  Take 10 mg by mouth 3 (three) times daily.     tiZANidine 4 MG tablet  Commonly known as: ZANAFLEX  Take 4 mg by mouth every 6 to 8 hours as needed.     traZODone 50 MG tablet  Commonly known as: DESYREL  Take 50 mg by mouth every evening.     TYLENOL ORAL  Take 1,000 mg by mouth every 4 (four) hours as needed.     UBRELVY ORAL  Take by mouth as needed.     VENTOLIN HFA 90 mcg/actuation inhaler  Generic drug: albuterol     VITAMIN D ORAL  Take by mouth as needed.     zonisamide 100 MG Cap  Commonly known as: ZONEGRAN  Take 200 mg by mouth once daily.            Discharge Procedure Orders   Diet general     Call MD for:  temperature >100.4     Call MD for:  persistent nausea and vomiting     Call MD for:  severe uncontrolled pain     Call MD for:  difficulty breathing, headache or visual disturbances     Call MD for:  redness, tenderness, or signs of infection (pain, swelling, redness, odor or green/yellow discharge around incision site)     Call MD for:  hives     Call MD for:  persistent dizziness or light-headedness     Call MD for:  extreme fatigue     Ice to affected area     Keep surgical extremity elevated     Lifting restrictions     Leave dressing on - Keep it clean, dry, and intact until clinic visit     Non weight bearing      Follow-up Information       Hernán Apodaca MD. Schedule an  appointment as soon as possible for a visit in 10 day(s).    Specialty: Orthopedic Surgery  Why: For suture removal, For wound re-check  Contact information:  [address] [facility] [address] [phone]

## 2025-02-04 NOTE — ANESTHESIA PREPROCEDURE EVALUATION
02/04/2025  Kecia Downing is a 64 y.o., female.      Pre-op Assessment    I have reviewed the Patient Summary Reports.     I have reviewed the Nursing Notes. I have reviewed the NPO Status.   I have reviewed the Medications.     Review of Systems  Anesthesia Hx:    BOBBY           Denies Family Hx of Anesthesia complications.   Personal Hx of Anesthesia complications                    Social:  Non-Smoker       Hematology/Oncology:  Hematology Normal   Oncology Normal                                   Cardiovascular:  Cardiovascular Normal                                              Pulmonary:    Asthma    Sleep Apnea                Renal/:  Renal/ Normal                 Hepatic/GI:     GERD                Musculoskeletal:  Arthritis               Neurological:    Neuromuscular Disease,  Headaches           Chronic Pain Syndrome                         Endocrine:        Obesity / BMI > 30      Physical Exam  General: Cooperative, Alert and Oriented    Airway:  Mallampati: II   Mouth Opening: Normal  TM Distance: Normal  Tongue: Normal  Neck ROM: Normal ROM    Dental:  Intact      Anesthesia Plan  Type of Anesthesia, risks & benefits discussed:    Anesthesia Type: Gen ETT  Intra-op Monitoring Plan: Standard ASA Monitors  Post Op Pain Control Plan: multimodal analgesia and peripheral nerve block  Induction:  IV  Airway Plan: Video, Post-Induction  Informed Consent: Informed consent signed with the Patient and all parties understand the risks and agree with anesthesia plan.  All questions answered.   ASA Score: 2    Ready For Surgery From Anesthesia Perspective.     .

## 2025-02-04 NOTE — TRANSFER OF CARE
"Anesthesia Transfer of Care Note    Patient: Kecia Downing    Procedure(s) Performed: Procedure(s) (LRB):  ORIF, FRACTURE, HUMERUS, CONDYLE / ORIF RIGHT DISTAL HUMERUS FRACTURE (Right)    Patient location: PACU    Anesthesia Type: general    Transport from OR: Transported from OR on 6-10 L/min O2 by face mask with adequate spontaneous ventilation    Post pain: adequate analgesia    Post assessment: no apparent anesthetic complications and tolerated procedure well    Post vital signs: stable    Level of consciousness: awake    Nausea/Vomiting: no nausea/vomiting    Complications: none    Transfer of care protocol was followed      Last vitals: Visit Vitals  /63 (BP Location: Left arm, Patient Position: Lying)   Pulse 95   Temp 36.6 °C (97.8 °F) (Skin)   Resp 16   Ht 5' 7" (1.702 m)   Wt 86.2 kg (190 lb)   SpO2 100%   Breastfeeding No   BMI 29.76 kg/m²     "

## 2025-02-04 NOTE — ANESTHESIA PROCEDURE NOTES
Peripheral Block    Patient location during procedure: pre-op   Block not for primary anesthetic.  Reason for block: at surgeon's request and post-op pain management   Post-op Pain Location: Right elbow    End time: 2/4/2025 11:49 AM    Staffing  Authorizing Provider: Roshan Scott MD  Performing Provider: Roshan Scott MD    Staffing  Performed by: Roshan Scott MD  Authorized by: Roshan Scott MD    Preanesthetic Checklist  Completed: patient identified, IV checked, site marked, risks and benefits discussed, surgical consent, monitors and equipment checked, pre-op evaluation and timeout performed  Peripheral Block  Patient position: supine  Prep: ChloraPrep  Patient monitoring: heart rate, cardiac monitor, continuous pulse ox, continuous capnometry and frequent blood pressure checks  Block type: supraclavicular  Laterality: right  Injection technique: single shot  Needle  Needle type: Stimuplex   Needle gauge: 22 G  Needle length: 2 in  Needle localization: anatomical landmarks and ultrasound guidance   -ultrasound image captured on disc.  Assessment  Injection assessment: negative aspiration, negative parasthesia and local visualized surrounding nerve  Paresthesia pain: none  Heart rate change: no  Slow fractionated injection: yes  Pain Tolerance: comfortable throughout block and no complaints  Medications:    Medications: ropivacaine (NAROPIN) injection 0.5% - Perineural   10 mL - 2/4/2025 11:49:00 AM  lidocaine (PF) injection 1% - Other   100 mg - 2/4/2025 11:49:00 AM    Additional Notes  VSS.  [facility] RN monitoring vitals throughout procedure.  Patient tolerated procedure well.

## 2025-02-04 NOTE — ANESTHESIA PROCEDURE NOTES
Intubation    Date/Time: 2/4/2025 11:42 AM    Performed by: Janeen Polo CRNA  Authorized by: Janeen Polo CRNA    Intubation:     Induction:  Intravenous    Intubated:  Postinduction    Mask Ventilation:  Easy with oral airway    Attempts:  1    Attempted By:  CRNA    Method of Intubation:  Video laryngoscopy    Blade:  Bustillos 3    Laryngeal View Grade: Grade I - full view of cords      Difficult Airway Encountered?: No      Complications:  None    Airway Device:  Oral endotracheal tube    Airway Device Size:  7.0    Style/Cuff Inflation:  Cuffed (inflated to minimal occlusive pressure)    Tube secured:  21    Secured at:  The lips    Placement Verified By:  Capnometry    Complicating Factors:  Poor neck/head extension    Findings Post-Intubation:  BS equal bilateral and atraumatic/condition of teeth unchanged  Notes:      Pt positioned neck to comfort prior to intubation. Neck remained in position throughout induction/intubation.

## 2025-02-04 NOTE — OR NURSING
Verbal order received from Dr Apodaca that patient can keep brooke in until time of discharge for comfort/ease with incontinence history and doesn't need to void prior to discharge.

## 2025-02-04 NOTE — ANESTHESIA PROCEDURE NOTES
Peripheral Block    Patient location during procedure: pre-op   Block not for primary anesthetic.  Reason for block: at surgeon's request and post-op pain management   Post-op Pain Location: Right Arm    End time: 2/4/2025 11:50 AM    Staffing  Authorizing Provider: Roshan Scott MD  Performing Provider: Roshan Scott MD    Staffing  Performed by: Roshan Scott MD  Authorized by: Roshan Scott MD    Preanesthetic Checklist  Completed: patient identified, IV checked, site marked, risks and benefits discussed, surgical consent, monitors and equipment checked, pre-op evaluation and timeout performed  Peripheral Block  Patient position: supine  Prep: ChloraPrep  Patient monitoring: heart rate, cardiac monitor, continuous pulse ox, continuous capnometry and frequent blood pressure checks  Block type: pectoral  Laterality: right  Injection technique: single shot  Needle  Needle type: Stimuplex   Needle gauge: 21 G  Needle length: 4 in  Needle localization: anatomical landmarks and ultrasound guidance   -ultrasound image captured on disc.  Assessment  Injection assessment: negative aspiration, negative parasthesia and local visualized surrounding nerve  Paresthesia pain: none  Heart rate change: no  Slow fractionated injection: yes  Pain Tolerance: comfortable throughout block and no complaints  Medications:    Medications: ropivacaine (NAROPIN) injection 0.5% - Perineural   20 mL - 2/4/2025 11:50:00 AM    Additional Notes  Performed PECS 2  VSS.  [facility] RN monitoring vitals throughout procedure.  Patient tolerated procedure well.

## 2025-02-04 NOTE — DISCHARGE INSTRUCTIONS

## 2025-02-04 NOTE — OR NURSING
Allen score of 10, patient is alert, reports tolerable pain, maintaining SPO2 on room air. Patient has met criteria and is ready for transfer.

## 2025-02-04 NOTE — PLAN OF CARE
The patient verbalized understanding of the surgical procedure as explained and documented on the consent form  by the physician and  has no further questions at this time. The surgical consent was signed by the patient's son and the patient's son's signature was witnessed by the RN.   Consent was signed by son as patient is R handed and fracture is on R humerus

## 2025-02-04 NOTE — ANESTHESIA POSTPROCEDURE EVALUATION
Anesthesia Post Evaluation    Patient: Kecia Downing    Procedure(s) Performed: Procedure(s) (LRB):  ORIF, FRACTURE, HUMERUS, CONDYLE / ORIF RIGHT DISTAL HUMERUS FRACTURE (Right)    Final Anesthesia Type: general      Patient location during evaluation: PACU  Patient participation: Yes- Able to Participate  Level of consciousness: awake and alert  Post-procedure vital signs: reviewed and stable  Pain management: adequate  Airway patency: patent    PONV status at discharge: No PONV  Anesthetic complications: no      Cardiovascular status: blood pressure returned to baseline  Respiratory status: spontaneous ventilation  Hydration status: euvolemic  Follow-up not needed.          Vitals Value Taken Time   /59 02/04/25 1646   Temp 36.5 °C (97.7 °F) 02/04/25 1645   Pulse 92 02/04/25 1651   Resp 16 02/04/25 1645   SpO2 94 % 02/04/25 1651   Vitals shown include unfiled device data.      No case tracking events are documented in the log.      Pain/Allen Score: Pain Rating Prior to Med Admin: 7 (2/4/2025  4:32 PM)  Allen Score: 10 (2/4/2025  4:30 PM)

## (undated) DEVICE — BIT DRILL 2.0MM D DEPTH 110MM

## (undated) DEVICE — GAUZE SPONGE 4X4 12PLY

## (undated) DEVICE — SUT MONOCRYL PLUS 4-0 P3

## (undated) DEVICE — ALCOHOL 70% ISOP W/GREEN 16OZ

## (undated) DEVICE — DRAPE C-ARM MINI DISP

## (undated) DEVICE — CANNULA PASSPORT 8 MM X 4CM.

## (undated) DEVICE — KIT FIBERTAK DISP ANCHOR 2.6

## (undated) DEVICE — ELECTRODE REM PLYHSV RETURN 9

## (undated) DEVICE — GOWN ECLIPSE REINF LV4 XLNG XL

## (undated) DEVICE — TAPE MEDIPORE 3 X 10YD

## (undated) DEVICE — SUT VICRYL 3-0 27 SH

## (undated) DEVICE — APPLICATOR CHLORAPREP ORN 26ML

## (undated) DEVICE — DRAPE U SPLIT SHEET 54X76IN

## (undated) DEVICE — SUT PROLENE 3-0 FS-1 MONO18

## (undated) DEVICE — SEE L#120831

## (undated) DEVICE — DRAPE STERI U-SHAPED 47X51IN

## (undated) DEVICE — NDL ARTHSCP MF SCORPION

## (undated) DEVICE — ALCOHOL ISOPROPYL BLU 70% 16OZ

## (undated) DEVICE — SPONGE COTTON TRAY 4X4IN

## (undated) DEVICE — DRESSING XEROFORM 1X8IN

## (undated) DEVICE — SUT PDS II 0 CT VIL MONO 36

## (undated) DEVICE — CORD FOR BIPOLAR FORCEPS 12

## (undated) DEVICE — PAD CAST SPECIALIST STRL 3

## (undated) DEVICE — SOL IRR SOD CHL .9% POUR

## (undated) DEVICE — PACK UPPER EXTREMITY BAPTIST

## (undated) DEVICE — GLOVE BIOGEL SKINSENSE PI 8.0

## (undated) DEVICE — SUT PROLENE 4-0 PS2 18 BLUE

## (undated) DEVICE — PAD SHOULDER CARE POLAR

## (undated) DEVICE — PAD CAST SPECIALIST STRL 4

## (undated) DEVICE — SPLINT PLASTER FAST SET 5X30IN

## (undated) DEVICE — SUPPORT SLING SHOT II MEDIUM

## (undated) DEVICE — GLOVE BIOGEL SKINSENSE PI 7.5

## (undated) DEVICE — SUT CTD VICRYL BR CR/CT-2

## (undated) DEVICE — WAND COBLATION TURBOVAC 90

## (undated) DEVICE — VIDEO RENTAL SERVICE

## (undated) DEVICE — CUFF TOURNIQUET DL PRT

## (undated) DEVICE — SOL IRR NACL .9% 3000ML

## (undated) DEVICE — CONNECTOR TUBING STR 5 IN 1

## (undated) DEVICE — GLOVE BIOGEL SKINSENSE PI 6.5

## (undated) DEVICE — ADHESIVE DERMABOND ADVANCED

## (undated) DEVICE — Device

## (undated) DEVICE — DRESSING XEROFORM NONADH 1X8IN

## (undated) DEVICE — PAD ABD 8X10 STERILE

## (undated) DEVICE — GLOVE BIOGEL SKINSENSE PI 7.0

## (undated) DEVICE — UNDERGLOVES BIOGEL PI SIZE 8

## (undated) DEVICE — UNDERGLOVE BIOGEL PI SZ 6.5 LF

## (undated) DEVICE — DRAPE INCISE IOBAN 2 23X17IN

## (undated) DEVICE — BLADE SHAVER 4.5 6/BX

## (undated) DEVICE — TOURNIQUET SB QC DP 18X4IN

## (undated) DEVICE — STAPLER SKIN PROXIMATE WIDE

## (undated) DEVICE — DRESSING XEROFORM FOIL PK 1X8

## (undated) DEVICE — NDL HYPO REG 25G X 1 1/2

## (undated) DEVICE — K-WIRE TRCR PT1.6MM DIA 150MM
Type: IMPLANTABLE DEVICE | Site: HUMERUS | Status: NON-FUNCTIONAL
Removed: 2025-02-04

## (undated) DEVICE — BLADE SURG STAINLESS STEEL #15

## (undated) DEVICE — POSITIONER IV ARMBOARD FOAM

## (undated) DEVICE — BUCKET PLASTER DISPOSABLE

## (undated) DEVICE — PAD UNDERPAD 30X30

## (undated) DEVICE — KNIFE CARPEL TUNNEL

## (undated) DEVICE — SET EXTENSION 30 IN W/LL ROLLE

## (undated) DEVICE — DRAPE TOP 53X102IN

## (undated) DEVICE — TUBE SET INFLOW/OUTFLOW

## (undated) DEVICE — KIT TRACTION SHLDR ST DISP LF

## (undated) DEVICE — SPONGE GAUZE 16PLY 4X4

## (undated) DEVICE — SEE MEDLINE ITEM 152529

## (undated) DEVICE — SUT VICRYL 2-0 8-18 CP-2

## (undated) DEVICE — WRAP COBAN NL STRL 4INX5YD

## (undated) DEVICE — SUT 4/0 18IN ETHILON BL P3

## (undated) DEVICE — BLADE GATOR 3.5 6 EACH/BOX

## (undated) DEVICE — SOL 9P NACL IRR PIC IL

## (undated) DEVICE — COVER MAYO STND XL 30X57IN

## (undated) DEVICE — SUT FIBERLINK TAPE BLU WHT 1.3

## (undated) DEVICE — TUBING SUC UNIV W/CONN 12FT

## (undated) DEVICE — SUT SUTURETAPE TIGERLINK 1.3MM

## (undated) DEVICE — SYR B-D DISP CONTROL 10CC100/C

## (undated) DEVICE — TIP YANKAUERS BULB NO VENT

## (undated) DEVICE — PAD ABDOMINAL STERILE 8X10IN

## (undated) DEVICE — BIT DRILL CALIB 45MM 2.5X135MM

## (undated) DEVICE — SOL ALCOHOL ISO 70% WNTGR 16OZ